# Patient Record
Sex: MALE | Employment: OTHER | URBAN - METROPOLITAN AREA
[De-identification: names, ages, dates, MRNs, and addresses within clinical notes are randomized per-mention and may not be internally consistent; named-entity substitution may affect disease eponyms.]

---

## 2024-09-04 ENCOUNTER — HOSPITAL ENCOUNTER (INPATIENT)
Facility: HOSPITAL | Age: 74
End: 2024-09-04
Attending: STUDENT IN AN ORGANIZED HEALTH CARE EDUCATION/TRAINING PROGRAM | Admitting: INTERNAL MEDICINE
Payer: MEDICARE

## 2024-09-04 ENCOUNTER — APPOINTMENT (OUTPATIENT)
Dept: RADIOLOGY | Facility: HOSPITAL | Age: 74
DRG: 689 | End: 2024-09-04
Payer: MEDICARE

## 2024-09-04 ENCOUNTER — APPOINTMENT (OUTPATIENT)
Dept: CARDIOLOGY | Facility: HOSPITAL | Age: 74
DRG: 689 | End: 2024-09-04
Payer: MEDICARE

## 2024-09-04 DIAGNOSIS — R42 POSTURAL DIZZINESS WITH NEAR SYNCOPE: ICD-10-CM

## 2024-09-04 DIAGNOSIS — R79.89 ELEVATED TROPONIN: ICD-10-CM

## 2024-09-04 DIAGNOSIS — Z79.4 TYPE 2 DIABETES MELLITUS WITHOUT COMPLICATION, WITH LONG-TERM CURRENT USE OF INSULIN (MULTI): ICD-10-CM

## 2024-09-04 DIAGNOSIS — E04.1 THYROID NODULE: ICD-10-CM

## 2024-09-04 DIAGNOSIS — R94.31 PROLONGED Q-T INTERVAL ON ECG: ICD-10-CM

## 2024-09-04 DIAGNOSIS — E11.9 TYPE 2 DIABETES MELLITUS WITHOUT COMPLICATION, WITH LONG-TERM CURRENT USE OF INSULIN (MULTI): ICD-10-CM

## 2024-09-04 DIAGNOSIS — R55 POSTURAL DIZZINESS WITH NEAR SYNCOPE: ICD-10-CM

## 2024-09-04 DIAGNOSIS — R29.6 FREQUENT FALLS: ICD-10-CM

## 2024-09-04 DIAGNOSIS — R29.898 LEFT LEG WEAKNESS: Primary | ICD-10-CM

## 2024-09-04 DIAGNOSIS — I10 PRIMARY HYPERTENSION: ICD-10-CM

## 2024-09-04 LAB
ALBUMIN SERPL-MCNC: 3.8 G/DL (ref 3.5–5)
ALP BLD-CCNC: 58 U/L (ref 35–125)
ALT SERPL-CCNC: 8 U/L (ref 5–40)
ANION GAP SERPL CALC-SCNC: 13 MMOL/L
APPEARANCE UR: CLEAR
AST SERPL-CCNC: 20 U/L (ref 5–40)
BACTERIA #/AREA URNS AUTO: ABNORMAL /HPF
BASOPHILS # BLD AUTO: 0.02 X10*3/UL (ref 0–0.1)
BASOPHILS NFR BLD AUTO: 0.4 %
BILIRUB SERPL-MCNC: 1 MG/DL (ref 0.1–1.2)
BILIRUB UR STRIP.AUTO-MCNC: NEGATIVE MG/DL
BUN SERPL-MCNC: 28 MG/DL (ref 8–25)
CALCIUM SERPL-MCNC: 9 MG/DL (ref 8.5–10.4)
CHLORIDE SERPL-SCNC: 102 MMOL/L (ref 97–107)
CO2 SERPL-SCNC: 25 MMOL/L (ref 24–31)
COLOR UR: YELLOW
CREAT SERPL-MCNC: 1 MG/DL (ref 0.4–1.6)
EGFRCR SERPLBLD CKD-EPI 2021: 79 ML/MIN/1.73M*2
EOSINOPHIL # BLD AUTO: 0.03 X10*3/UL (ref 0–0.4)
EOSINOPHIL NFR BLD AUTO: 0.5 %
ERYTHROCYTE [DISTWIDTH] IN BLOOD BY AUTOMATED COUNT: 15.1 % (ref 11.5–14.5)
EST. AVERAGE GLUCOSE BLD GHB EST-MCNC: 134 MG/DL
GLUCOSE BLD MANUAL STRIP-MCNC: 92 MG/DL (ref 74–99)
GLUCOSE SERPL-MCNC: 103 MG/DL (ref 65–99)
GLUCOSE UR STRIP.AUTO-MCNC: NORMAL MG/DL
HBA1C MFR BLD: 6.3 %
HCT VFR BLD AUTO: 33.7 % (ref 41–52)
HGB BLD-MCNC: 11.1 G/DL (ref 13.5–17.5)
IMM GRANULOCYTES # BLD AUTO: 0.02 X10*3/UL (ref 0–0.5)
IMM GRANULOCYTES NFR BLD AUTO: 0.4 % (ref 0–0.9)
KETONES UR STRIP.AUTO-MCNC: ABNORMAL MG/DL
LEUKOCYTE ESTERASE UR QL STRIP.AUTO: ABNORMAL
LYMPHOCYTES # BLD AUTO: 1.07 X10*3/UL (ref 0.8–3)
LYMPHOCYTES NFR BLD AUTO: 19.1 %
MCH RBC QN AUTO: 29.9 PG (ref 26–34)
MCHC RBC AUTO-ENTMCNC: 32.9 G/DL (ref 32–36)
MCV RBC AUTO: 91 FL (ref 80–100)
MONOCYTES # BLD AUTO: 0.48 X10*3/UL (ref 0.05–0.8)
MONOCYTES NFR BLD AUTO: 8.6 %
MUCOUS THREADS #/AREA URNS AUTO: ABNORMAL /LPF
NEUTROPHILS # BLD AUTO: 3.98 X10*3/UL (ref 1.6–5.5)
NEUTROPHILS NFR BLD AUTO: 71 %
NITRITE UR QL STRIP.AUTO: NEGATIVE
NRBC BLD-RTO: 0 /100 WBCS (ref 0–0)
PH UR STRIP.AUTO: 5.5 [PH]
PLATELET # BLD AUTO: 225 X10*3/UL (ref 150–450)
POTASSIUM SERPL-SCNC: 3.5 MMOL/L (ref 3.4–5.1)
PROT SERPL-MCNC: 7 G/DL (ref 5.9–7.9)
PROT UR STRIP.AUTO-MCNC: ABNORMAL MG/DL
RBC # BLD AUTO: 3.71 X10*6/UL (ref 4.5–5.9)
RBC # UR STRIP.AUTO: NEGATIVE /UL
RBC #/AREA URNS AUTO: ABNORMAL /HPF
SODIUM SERPL-SCNC: 140 MMOL/L (ref 133–145)
SP GR UR STRIP.AUTO: 1.04
SQUAMOUS #/AREA URNS AUTO: ABNORMAL /HPF
T4 FREE SERPL-MCNC: 1.2 NG/DL (ref 0.9–1.7)
TROPONIN T SERPL-MCNC: 103 NG/L
TROPONIN T SERPL-MCNC: 85 NG/L
TROPONIN T SERPL-MCNC: 88 NG/L
TSH SERPL DL<=0.05 MIU/L-ACNC: 2.96 MIU/L (ref 0.27–4.2)
UROBILINOGEN UR STRIP.AUTO-MCNC: ABNORMAL MG/DL
WBC # BLD AUTO: 5.6 X10*3/UL (ref 4.4–11.3)
WBC #/AREA URNS AUTO: ABNORMAL /HPF

## 2024-09-04 PROCEDURE — 2060000001 HC INTERMEDIATE ICU ROOM DAILY

## 2024-09-04 PROCEDURE — 83036 HEMOGLOBIN GLYCOSYLATED A1C: CPT | Performed by: INTERNAL MEDICINE

## 2024-09-04 PROCEDURE — 99223 1ST HOSP IP/OBS HIGH 75: CPT | Performed by: INTERNAL MEDICINE

## 2024-09-04 PROCEDURE — 70450 CT HEAD/BRAIN W/O DYE: CPT

## 2024-09-04 PROCEDURE — 80053 COMPREHEN METABOLIC PANEL: CPT | Performed by: CLINICAL NURSE SPECIALIST

## 2024-09-04 PROCEDURE — 2500000004 HC RX 250 GENERAL PHARMACY W/ HCPCS (ALT 636 FOR OP/ED): Performed by: INTERNAL MEDICINE

## 2024-09-04 PROCEDURE — 84484 ASSAY OF TROPONIN QUANT: CPT | Performed by: CLINICAL NURSE SPECIALIST

## 2024-09-04 PROCEDURE — 72125 CT NECK SPINE W/O DYE: CPT

## 2024-09-04 PROCEDURE — 70450 CT HEAD/BRAIN W/O DYE: CPT | Performed by: STUDENT IN AN ORGANIZED HEALTH CARE EDUCATION/TRAINING PROGRAM

## 2024-09-04 PROCEDURE — 36415 COLL VENOUS BLD VENIPUNCTURE: CPT | Performed by: CLINICAL NURSE SPECIALIST

## 2024-09-04 PROCEDURE — 71045 X-RAY EXAM CHEST 1 VIEW: CPT | Performed by: RADIOLOGY

## 2024-09-04 PROCEDURE — 84443 ASSAY THYROID STIM HORMONE: CPT | Performed by: INTERNAL MEDICINE

## 2024-09-04 PROCEDURE — 70498 CT ANGIOGRAPHY NECK: CPT

## 2024-09-04 PROCEDURE — 84439 ASSAY OF FREE THYROXINE: CPT | Performed by: INTERNAL MEDICINE

## 2024-09-04 PROCEDURE — 2550000001 HC RX 255 CONTRASTS: Performed by: STUDENT IN AN ORGANIZED HEALTH CARE EDUCATION/TRAINING PROGRAM

## 2024-09-04 PROCEDURE — 71045 X-RAY EXAM CHEST 1 VIEW: CPT

## 2024-09-04 PROCEDURE — 70496 CT ANGIOGRAPHY HEAD: CPT | Performed by: RADIOLOGY

## 2024-09-04 PROCEDURE — 2500000001 HC RX 250 WO HCPCS SELF ADMINISTERED DRUGS (ALT 637 FOR MEDICARE OP): Performed by: CLINICAL NURSE SPECIALIST

## 2024-09-04 PROCEDURE — 99291 CRITICAL CARE FIRST HOUR: CPT | Mod: 25 | Performed by: CLINICAL NURSE SPECIALIST

## 2024-09-04 PROCEDURE — 81001 URINALYSIS AUTO W/SCOPE: CPT | Performed by: CLINICAL NURSE SPECIALIST

## 2024-09-04 PROCEDURE — 2500000004 HC RX 250 GENERAL PHARMACY W/ HCPCS (ALT 636 FOR OP/ED): Performed by: CLINICAL NURSE SPECIALIST

## 2024-09-04 PROCEDURE — 96361 HYDRATE IV INFUSION ADD-ON: CPT

## 2024-09-04 PROCEDURE — 82947 ASSAY GLUCOSE BLOOD QUANT: CPT

## 2024-09-04 PROCEDURE — 93005 ELECTROCARDIOGRAM TRACING: CPT

## 2024-09-04 PROCEDURE — 87086 URINE CULTURE/COLONY COUNT: CPT | Mod: WESLAB | Performed by: CLINICAL NURSE SPECIALIST

## 2024-09-04 PROCEDURE — 72125 CT NECK SPINE W/O DYE: CPT | Performed by: RADIOLOGY

## 2024-09-04 PROCEDURE — 96360 HYDRATION IV INFUSION INIT: CPT

## 2024-09-04 PROCEDURE — 85025 COMPLETE CBC W/AUTO DIFF WBC: CPT | Performed by: CLINICAL NURSE SPECIALIST

## 2024-09-04 PROCEDURE — 70498 CT ANGIOGRAPHY NECK: CPT | Performed by: RADIOLOGY

## 2024-09-04 RX ORDER — SODIUM CHLORIDE 9 MG/ML
75 INJECTION, SOLUTION INTRAVENOUS CONTINUOUS
Status: DISCONTINUED | OUTPATIENT
Start: 2024-09-04 | End: 2024-09-05

## 2024-09-04 RX ORDER — ONDANSETRON HYDROCHLORIDE 2 MG/ML
4 INJECTION, SOLUTION INTRAVENOUS EVERY 8 HOURS PRN
Status: ACTIVE | OUTPATIENT
Start: 2024-09-04

## 2024-09-04 RX ORDER — CEFTRIAXONE 1 G/50ML
1 INJECTION, SOLUTION INTRAVENOUS EVERY 24 HOURS
Status: DISCONTINUED | OUTPATIENT
Start: 2024-09-04 | End: 2024-09-06

## 2024-09-04 RX ORDER — ONDANSETRON 4 MG/1
4 TABLET, FILM COATED ORAL EVERY 8 HOURS PRN
Status: ACTIVE | OUTPATIENT
Start: 2024-09-04

## 2024-09-04 RX ORDER — ACETAMINOPHEN 650 MG/1
650 SUPPOSITORY RECTAL EVERY 4 HOURS PRN
Status: ACTIVE | OUTPATIENT
Start: 2024-09-04

## 2024-09-04 RX ORDER — ACETAMINOPHEN 160 MG/5ML
650 SOLUTION ORAL EVERY 4 HOURS PRN
Status: ACTIVE | OUTPATIENT
Start: 2024-09-04

## 2024-09-04 RX ORDER — NAPROXEN SODIUM 220 MG/1
324 TABLET, FILM COATED ORAL ONCE
Status: COMPLETED | OUTPATIENT
Start: 2024-09-04 | End: 2024-09-04

## 2024-09-04 RX ORDER — ACETAMINOPHEN 325 MG/1
650 TABLET ORAL EVERY 4 HOURS PRN
Status: ACTIVE | OUTPATIENT
Start: 2024-09-04

## 2024-09-04 SDOH — HEALTH STABILITY: MENTAL HEALTH
HOW OFTEN DO YOU NEED TO HAVE SOMEONE HELP YOU WHEN YOU READ INSTRUCTIONS, PAMPHLETS, OR OTHER WRITTEN MATERIAL FROM YOUR DOCTOR OR PHARMACY?: NEVER

## 2024-09-04 SDOH — HEALTH STABILITY: PHYSICAL HEALTH: ON AVERAGE, HOW MANY DAYS PER WEEK DO YOU ENGAGE IN MODERATE TO STRENUOUS EXERCISE (LIKE A BRISK WALK)?: 0 DAYS

## 2024-09-04 SDOH — ECONOMIC STABILITY: FOOD INSECURITY: WITHIN THE PAST 12 MONTHS, YOU WORRIED THAT YOUR FOOD WOULD RUN OUT BEFORE YOU GOT MONEY TO BUY MORE.: NEVER TRUE

## 2024-09-04 SDOH — SOCIAL STABILITY: SOCIAL INSECURITY: WITHIN THE LAST YEAR, HAVE YOU BEEN HUMILIATED OR EMOTIONALLY ABUSED IN OTHER WAYS BY YOUR PARTNER OR EX-PARTNER?: NO

## 2024-09-04 SDOH — HEALTH STABILITY: MENTAL HEALTH
STRESS IS WHEN SOMEONE FEELS TENSE, NERVOUS, ANXIOUS, OR CAN'T SLEEP AT NIGHT BECAUSE THEIR MIND IS TROUBLED. HOW STRESSED ARE YOU?: NOT AT ALL

## 2024-09-04 SDOH — SOCIAL STABILITY: SOCIAL NETWORK: HOW OFTEN DO YOU ATTEND CHURCH OR RELIGIOUS SERVICES?: NEVER

## 2024-09-04 SDOH — ECONOMIC STABILITY: TRANSPORTATION INSECURITY
IN THE PAST 12 MONTHS, HAS THE LACK OF TRANSPORTATION KEPT YOU FROM MEDICAL APPOINTMENTS OR FROM GETTING MEDICATIONS?: NO

## 2024-09-04 SDOH — ECONOMIC STABILITY: HOUSING INSECURITY: IN THE PAST 12 MONTHS, HOW MANY TIMES HAVE YOU MOVED WHERE YOU WERE LIVING?: 0

## 2024-09-04 SDOH — SOCIAL STABILITY: SOCIAL INSECURITY
WITHIN THE LAST YEAR, HAVE TO BEEN RAPED OR FORCED TO HAVE ANY KIND OF SEXUAL ACTIVITY BY YOUR PARTNER OR EX-PARTNER?: NO

## 2024-09-04 SDOH — ECONOMIC STABILITY: INCOME INSECURITY: IN THE LAST 12 MONTHS, WAS THERE A TIME WHEN YOU WERE NOT ABLE TO PAY THE MORTGAGE OR RENT ON TIME?: NO

## 2024-09-04 SDOH — HEALTH STABILITY: MENTAL HEALTH: HOW OFTEN DO YOU HAVE 6 OR MORE DRINKS ON ONE OCCASION?: NEVER

## 2024-09-04 SDOH — HEALTH STABILITY: MENTAL HEALTH: HOW OFTEN DO YOU HAVE A DRINK CONTAINING ALCOHOL?: NEVER

## 2024-09-04 SDOH — ECONOMIC STABILITY: FOOD INSECURITY: WITHIN THE PAST 12 MONTHS, THE FOOD YOU BOUGHT JUST DIDN'T LAST AND YOU DIDN'T HAVE MONEY TO GET MORE.: NEVER TRUE

## 2024-09-04 SDOH — HEALTH STABILITY: MENTAL HEALTH: HOW MANY STANDARD DRINKS CONTAINING ALCOHOL DO YOU HAVE ON A TYPICAL DAY?: PATIENT DOES NOT DRINK

## 2024-09-04 SDOH — ECONOMIC STABILITY: TRANSPORTATION INSECURITY
IN THE PAST 12 MONTHS, HAS LACK OF TRANSPORTATION KEPT YOU FROM MEETINGS, WORK, OR FROM GETTING THINGS NEEDED FOR DAILY LIVING?: NO

## 2024-09-04 SDOH — ECONOMIC STABILITY: HOUSING INSECURITY: AT ANY TIME IN THE PAST 12 MONTHS, WERE YOU HOMELESS OR LIVING IN A SHELTER (INCLUDING NOW)?: NO

## 2024-09-04 SDOH — SOCIAL STABILITY: SOCIAL INSECURITY
WITHIN THE LAST YEAR, HAVE YOU BEEN KICKED, HIT, SLAPPED, OR OTHERWISE PHYSICALLY HURT BY YOUR PARTNER OR EX-PARTNER?: NO

## 2024-09-04 SDOH — SOCIAL STABILITY: SOCIAL NETWORK: ARE YOU MARRIED, WIDOWED, DIVORCED, SEPARATED, NEVER MARRIED, OR LIVING WITH A PARTNER?: MARRIED

## 2024-09-04 SDOH — SOCIAL STABILITY: SOCIAL INSECURITY: WITHIN THE LAST YEAR, HAVE YOU BEEN AFRAID OF YOUR PARTNER OR EX-PARTNER?: NO

## 2024-09-04 SDOH — SOCIAL STABILITY: SOCIAL NETWORK
DO YOU BELONG TO ANY CLUBS OR ORGANIZATIONS SUCH AS CHURCH GROUPS UNIONS, FRATERNAL OR ATHLETIC GROUPS, OR SCHOOL GROUPS?: NO

## 2024-09-04 SDOH — ECONOMIC STABILITY: INCOME INSECURITY: HOW HARD IS IT FOR YOU TO PAY FOR THE VERY BASICS LIKE FOOD, HOUSING, MEDICAL CARE, AND HEATING?: NOT HARD AT ALL

## 2024-09-04 SDOH — HEALTH STABILITY: PHYSICAL HEALTH: ON AVERAGE, HOW MANY MINUTES DO YOU ENGAGE IN EXERCISE AT THIS LEVEL?: 0 MIN

## 2024-09-04 SDOH — SOCIAL STABILITY: SOCIAL NETWORK: HOW OFTEN DO YOU GET TOGETHER WITH FRIENDS OR RELATIVES?: NEVER

## 2024-09-04 SDOH — SOCIAL STABILITY: SOCIAL NETWORK: HOW OFTEN DO YOU ATTENT MEETINGS OF THE CLUB OR ORGANIZATION YOU BELONG TO?: NEVER

## 2024-09-04 SDOH — SOCIAL STABILITY: SOCIAL NETWORK: IN A TYPICAL WEEK, HOW MANY TIMES DO YOU TALK ON THE PHONE WITH FAMILY, FRIENDS, OR NEIGHBORS?: NEVER

## 2024-09-04 ASSESSMENT — PAIN - FUNCTIONAL ASSESSMENT: PAIN_FUNCTIONAL_ASSESSMENT: 0-10

## 2024-09-04 ASSESSMENT — ENCOUNTER SYMPTOMS
APPETITE CHANGE: 0
DIARRHEA: 0
CHOKING: 0
EYE PAIN: 0
PALPITATIONS: 0
COLOR CHANGE: 0
APNEA: 0
CONSTIPATION: 0
ACTIVITY CHANGE: 1
ABDOMINAL PAIN: 0
POLYDIPSIA: 0
COUGH: 0
ABDOMINAL DISTENTION: 0
DIAPHORESIS: 0
WEAKNESS: 1
EYE DISCHARGE: 0
FATIGUE: 0

## 2024-09-04 ASSESSMENT — LIFESTYLE VARIABLES
HAVE YOU EVER FELT YOU SHOULD CUT DOWN ON YOUR DRINKING: NO
SKIP TO QUESTIONS 9-10: 1
TOTAL SCORE: 0
AUDIT-C TOTAL SCORE: 0
EVER FELT BAD OR GUILTY ABOUT YOUR DRINKING: NO
HAVE PEOPLE ANNOYED YOU BY CRITICIZING YOUR DRINKING: NO
EVER HAD A DRINK FIRST THING IN THE MORNING TO STEADY YOUR NERVES TO GET RID OF A HANGOVER: NO

## 2024-09-04 ASSESSMENT — COLUMBIA-SUICIDE SEVERITY RATING SCALE - C-SSRS
2. HAVE YOU ACTUALLY HAD ANY THOUGHTS OF KILLING YOURSELF?: NO
1. IN THE PAST MONTH, HAVE YOU WISHED YOU WERE DEAD OR WISHED YOU COULD GO TO SLEEP AND NOT WAKE UP?: NO
6. HAVE YOU EVER DONE ANYTHING, STARTED TO DO ANYTHING, OR PREPARED TO DO ANYTHING TO END YOUR LIFE?: NO

## 2024-09-04 ASSESSMENT — PAIN SCALES - GENERAL: PAINLEVEL_OUTOF10: 0 - NO PAIN

## 2024-09-04 NOTE — H&P
History Of Present Illness  Gabriel Skinner is a 74 y.o. male presenting with dizziness and fall. He denied any fever or chills. Patient's family member who provided most of the history noted that she saw her earlier today and he was in his usual state of health. She noted that she left and got a call that he fell while going to fast food restaurant. He noted that patient was assessed and noted to be doing well. She noted that her BGL was checked at that time and was around 117. About 15 minutes later, she noted that he was staring into space and not answering question appropriately. He reported left leg weakness. But this was not appreciated during my evaluation,  Patient has a hx of stroke about 4 or 5 years ago. She was worried about repeat stroke and that's why he brought patient to the hospital.  During my evaluation, Patient finished his dinner. He denied any symptoms currently and per family member at bed side, patient is back to his base line  Trop was elevated, otherwise all labs were within normal limit.     Past Medical History  Type 2 DM  Hx of stroke    Surgical History  He has no past surgical history on file.     Social History  He denied alcohol and tobacco abuse    Family History  No family history on file.     Allergies  Patient has no known allergies.    Review of Systems   Constitutional:  Positive for activity change. Negative for appetite change, diaphoresis and fatigue.   HENT:  Negative for congestion, drooling, ear pain and mouth sores.    Eyes:  Negative for pain and discharge.   Respiratory:  Negative for apnea, cough and choking.    Cardiovascular:  Negative for chest pain and palpitations.   Gastrointestinal:  Negative for abdominal distention, abdominal pain, constipation and diarrhea.   Endocrine: Negative for cold intolerance, polydipsia and polyuria.   Skin:  Negative for color change and pallor.   Neurological:  Positive for weakness.        Physical Exam  Constitutional:        Appearance: Normal appearance.   HENT:      Head: Normocephalic and atraumatic.      Mouth/Throat:      Mouth: Mucous membranes are moist.      Pharynx: Oropharynx is clear.   Eyes:      Extraocular Movements: Extraocular movements intact.      Pupils: Pupils are equal, round, and reactive to light.   Cardiovascular:      Rate and Rhythm: Normal rate and regular rhythm.      Pulses: Normal pulses.      Heart sounds: Normal heart sounds.   Pulmonary:      Effort: Pulmonary effort is normal.      Breath sounds: Normal breath sounds.   Abdominal:      General: Bowel sounds are normal.      Palpations: Abdomen is soft.   Musculoskeletal:         General: Normal range of motion.      Cervical back: Normal range of motion and neck supple.   Skin:     General: Skin is warm.   Neurological:      General: No focal deficit present.      Mental Status: He is alert and oriented to person, place, and time.          Last Recorded Vitals  /76   Pulse 95   Temp 36.7 °C (98.1 °F)   Resp 18   Wt 70.8 kg (156 lb)   SpO2 99%     Relevant Results         Assessment/Plan     Altered mental status: Stroke team saw patient. Admitted for work up for stroke rule out. MRI ordered. Neuro consulted. TSH and T4 are within normal limit. Possibly unmasking of old stroke by ?UTI.   Urinary Tract infection: Started on IV Ceftriaxone. Wait for Urine culture.   DM type 2: concern for episodes of hypoglycemia. Check A1c. Diabetic diet. Hold off on antihyperglycemic agent.   Hx of CVA: continue home meds.   Elevated troponin: this is most likely demand ischemia. Will appreciate cardiology recommendation. Trop has been trending down.  Fall: unknown mechanism. PT/OT    Eduardo Ortega MD

## 2024-09-04 NOTE — TELECONSULT
HPI:  74 y.o. male with h/o stroke, on plavix, here after fall on curb.  Was found to be confused and with left leg weakness.    Last known Well: 9am  NIH Stroke Scale Reported: 4    Prior Functional Status (Modified Overton Scale):  3 Moderate disability; requiring some help, but able to walk without assistance     Imaging Results:  Head CT: neg  Head/Neck CTA/P: pending     Assessment:   Working Diagnosis:   Ischemic Stroke       Recommendations:   IV tPA is not recommended. Rationale: Out of time window     Patient is NOT a candidate for endovascular treatment.  Rationale:       Additional Recommendations:  Admit for stroke workup  MRI brain  Continue antiplatelets.     Consult completed by:  TELEPHONE communication was used to provide this telehealth service.  Time includes consultation with ED provider and extensive review of data- history, medical records, test results, and neuroimaging studies: 5 - 10 mins was spent in consultation

## 2024-09-04 NOTE — PROGRESS NOTES
09/04/24 1800   Physical Activity   On average, how many days per week do you engage in moderate to strenuous exercise (like a brisk walk)? 0 days   On average, how many minutes do you engage in exercise at this level? 0 min   Financial Resource Strain   How hard is it for you to pay for the very basics like food, housing, medical care, and heating? Not hard   Housing Stability   In the last 12 months, was there a time when you were not able to pay the mortgage or rent on time? N   In the past 12 months, how many times have you moved where you were living? 0   At any time in the past 12 months, were you homeless or living in a shelter (including now)? N   Transportation Needs   In the past 12 months, has lack of transportation kept you from medical appointments or from getting medications? no   In the past 12 months, has lack of transportation kept you from meetings, work, or from getting things needed for daily living? No   Food Insecurity   Within the past 12 months, you worried that your food would run out before you got the money to buy more. Never true   Within the past 12 months, the food you bought just didn't last and you didn't have money to get more. Never true   Stress   Do you feel stress - tense, restless, nervous, or anxious, or unable to sleep at night because your mind is troubled all the time - these days? Not at all   Social Connections   In a typical week, how many times do you talk on the phone with family, friends, or neighbors? Never  (Pt only speaks to his wife on the phone when she calls during the day to check on him)   How often do you get together with friends or relatives? Never  (Pt and his wife live in 2 seperate locations but she sees him daily and assist him with shopping, his BS ect)   How often do you attend Baptist or Taoist services? Never   Do you belong to any clubs or organizations such as Baptist groups, unions, fraternal or athletic groups, or school groups? No   How  often do you attend meetings of the clubs or organizations you belong to? Never   Are you , , , , never , or living with a partner?    Intimate Partner Violence   Within the last year, have you been afraid of your partner or ex-partner? No   Within the last year, have you been humiliated or emotionally abused in other ways by your partner or ex-partner? No   Within the last year, have you been kicked, hit, slapped, or otherwise physically hurt by your partner or ex-partner? No   Within the last year, have you been raped or forced to have any kind of sexual activity by your partner or ex-partner? No   Alcohol Use   Q1: How often do you have a drink containing alcohol? Never   Q2: How many drinks containing alcohol do you have on a typical day when you are drinking? None   Q3: How often do you have six or more drinks on one occasion? Never   Health Literacy   How often do you need to have someone help you when you read instructions, pamphlets, or other written material from your doctor or pharmacy? Never

## 2024-09-04 NOTE — ED PROVIDER NOTES
Department of Emergency Medicine   ED  Provider Note  Admit Date/RoomTime: 9/4/2024  1:43 PM  ED Room: PSY20/PSY20        History of Present Illness:  Chief Complaint   Patient presents with    Altered Mental Status         Gabriel Skinner is a 74 y.o. male history of BPH, acid reflux, hyperlipidemia, hypertension, diabetes history of stroke 2019 presenting to the ED for altered mental status, patient went to the fire station today and had a fall outside.  When family saw him about 20 minutes later he was not acting his normal staring off into space.  Family states this is not normal for him.  When she left him this morning he was acting appropriately following commands looking at her when he would talk to her.  Patient reports he had an episode 2 weeks ago where he had had a fall with similar symptoms the symptoms resolved very quickly.  Patient is currently not on any blood thinners.  Reports he has visual disturbances from his previous stroke with no change.  Usually has difficulty seeing out of his right eye.  Daughter states that he is able to walk with a cane and has never had difficulty moving his left leg like he is at this time.  Last known normal 9:00 this morning per family  Patient states he does not have any complaints at this time no chest pain no headache denies any send or loss of consciousness.  No abdominal pain nausea vomiting or diarrhea no dizziness.      Review of Systems:   Pertinent positives and negatives are stated within HPI, all other systems reviewed and are negative.        --------------------------------------------- PAST HISTORY ---------------------------------------------  Past Medical History:  has no past medical history on file.  Past Surgical History:  has no past surgical history on file.  Social History:    Family History: family history is not on file.. Unless otherwise noted, family history is non contributory  The patient’s home medications have been reviewed.  Allergies:  Patient has no known allergies.        ---------------------------------------------------PHYSICAL EXAM--------------------------------------    GENERAL APPEARANCE: Awake and alert.   VITAL SIGNS: As per the nurses' triage record.   HEENT: Normocephalic, atraumatic.  No raccoon eyes or hinds signs noted.  No epistaxis noted.  No bite to the tongue or lip.  Extraocular muscles are intact. Pupils equal round and reactive to light. Conjunctiva are pink. Negative scleral icterus. Mucous membranes are moist. Tongue in the midline. Pharynx was without erythema or exudates, uvula midline no epistaxis noted.  No bite to the tongue or lip.  No hemotympanum noted.  No epistaxis noted.  No contusions abrasions lacerations noted to the face or scalp.  Patient is obscured in the right eye chronic for patient for family  NECK: Soft Nontender and supple, full gross ROM, no meningeal signs.  Full range of motion without pain  CHEST: Nontender to palpation. Clear to auscultation bilaterally. No rales, rhonchi, or wheezing.   HEART: S1, S2. Regular rate and rhythm. No murmurs, gallops or rubs.  Strong and equal pulses in the extremities.   ABDOMEN: Soft, nontender, nondistended, positive bowel sounds, no palpable masses.  MUSCULCSKELETAL:  Full gross active range of motion.   NEUROLOGICAL: Awake, alert and oriented x 3. Power intact in the upper and lower extremities. Sensation is intact to light touch in the upper and lower extremities.  No effort against gravity left lower extremity no ataxia noted.  Sensation intact.  Was unable to tell me the month reported November  IMMUNOLOGICAL: No lymphatic streaking noted   DERM: No petechiae, rashes, or ecchymoses.          ------------------------- NURSING NOTES AND VITALS REVIEWED ---------------------------  The nursing notes within the ED encounter and vital signs as below have been reviewed by myself  /76   Pulse 95   Temp 36.7 °C (98.1 °F)   Resp 18   Ht 1.829 m (6')   Wt  70.8 kg (156 lb)   SpO2 99%   BMI 21.16 kg/m²     Oxygen Saturation Interpretation: 99% room air    The cardiac monitor revealed sinus rhythm with a heart rate in the 90s as interpreted by me. The cardiac monitor was ordered secondary to the patient's heart rate and to monitor the patient for dysrhythmia.       The patient’s available past medical records and past encounters were reviewed.          -----------------------DIAGNOSTIC RESULTS------------------------  LABS:    Labs Reviewed   CBC WITH AUTO DIFFERENTIAL - Abnormal       Result Value    WBC 5.6      nRBC 0.0      RBC 3.71 (*)     Hemoglobin 11.1 (*)     Hematocrit 33.7 (*)     MCV 91      MCH 29.9      MCHC 32.9      RDW 15.1 (*)     Platelets 225      Neutrophils % 71.0      Immature Granulocytes %, Automated 0.4      Lymphocytes % 19.1      Monocytes % 8.6      Eosinophils % 0.5      Basophils % 0.4      Neutrophils Absolute 3.98      Immature Granulocytes Absolute, Automated 0.02      Lymphocytes Absolute 1.07      Monocytes Absolute 0.48      Eosinophils Absolute 0.03      Basophils Absolute 0.02     COMPREHENSIVE METABOLIC PANEL - Abnormal    Glucose 103 (*)     Sodium 140      Potassium 3.5      Chloride 102      Bicarbonate 25      Urea Nitrogen        Creatinine 1.00      eGFR 79      Calcium 9.0      Albumin 3.8      Alkaline Phosphatase 58      Total Protein 7.0      AST 20      Bilirubin, Total 1.0      ALT 8      Anion Gap 13     SERIAL TROPONIN, INITIAL ([facility]) - Abnormal    Troponin T, High Sensitivity 103 (*)    SERIAL TROPONIN,  2 HOUR ([facility]) - Abnormal    Troponin T, High Sensitivity 88 (*)    POCT GLUCOSE - Normal    POCT Glucose 92     TROPONIN T SERIES, HIGH SENSITIVITY (0, 2 HR, 6 HR)    Narrative:     The following orders were created for panel order Troponin T Series, High Sensitivity (0, 2HR, 6HR).  Procedure                               Abnormality         Status                     ---------                                -----------         ------                     Serial Troponin, Initial...[[id number]]  Abnormal            Final result               Serial Troponin, 2 Hour ...[[id number]]  Abnormal            Final result               Serial Troponin, 6 Hour ...[[id number]]                                                   Please view results for these tests on the individual orders.   URINALYSIS WITH REFLEX CULTURE AND MICROSCOPIC    Narrative:     The following orders were created for panel order Urinalysis with Reflex Culture and Microscopic.  Procedure                               Abnormality         Status                     ---------                               -----------         ------                     Urinalysis with Reflex C...[[id number]]                                                 Extra Urine Gray Tube[[id number]]                                                         Please view results for these tests on the individual orders.   URINALYSIS WITH REFLEX CULTURE AND MICROSCOPIC   EXTRA URINE GRAY TUBE   PROTIME-INR   APTT   TSH   THYROXINE, FREE   POCT GLUCOSE METER       As interpreted by me, the above displayed labs are abnormal. All other labs obtained during this visit were within normal range or not returned as of this dictation.      EKG Interpretation    1421 I personally reviewed and interpreted the EKG @1404: NSR 92, normal axis, no appreciable ischemia, prolonged Qtc 487, and prior EKG reviewed without any appreciable specific/identifiable changes. [BC]           CT angio head and neck w and wo IV contrast   Final Result   1. No large branch vessel cutoffs of the intracranial or cervical   vessels.   2. There is a short segment of moderate stenosis involving the right   A3 segment of the anterior cerebral artery, inferior M3 branch of the   right middle cerebral artery, and long segment luminal irregularity   and mild stenosis of the inferior M2 branch of the left MCA. Findings   may represent  sequela intracranial atherosclerotic disease.   3. There is mild luminal stenosis of the proximal and distal right   intradural vertebral artery.   4. Multiple hypodense bilateral thyroid nodules measuring up to 1.2   cm. Recommend correlation with thyroid ultrasound if not previously   obtained.        I personally reviewed the images/study and I agree with the findings   as stated by Resident Beverly Rios. This study was interpreted at   [facility], [place], [place].        MACRO:   None        Signed by: Taryn Cash 9/4/2024 4:24 PM   Dictation workstation:   [id number]      CT cervical spine wo IV contrast   Final Result   No evidence for an acute fracture or subluxation of the cervical   spine.        MACRO:   None        Signed by: Anjana Nicholas 9/4/2024 2:47 PM   Dictation workstation:   [id number]      XR chest 1 view   Final Result   No acute cardiopulmonary process.        MACRO:   None        Signed by: Anjana Nicholas 9/4/2024 2:45 PM   Dictation workstation:   [id number]      CT brain attack head wo IV contrast   Final Result   No acute intracranial hemorrhage, mass effect, or CT apparent acute   infarct. Chronic microvascular ischemia, sequela of prior bilateral   infarcts and involutional changes.        Dalton Clancy discussed the significance and urgency of this   critical finding by telephone with  FABBY WISE on 9/4/2024 at 2:37   pm.  (**-RCF-**) Findings:  See findings.                  Signed by: Dalton Clancy 9/4/2024 2:38 PM   Dictation workstation:   [id number]              CT angio head and neck w and wo IV contrast   Final Result   1. No large branch vessel cutoffs of the intracranial or cervical   vessels.   2. There is a short segment of moderate stenosis involving the right   A3 segment of the anterior cerebral artery, inferior M3 branch of the   right middle cerebral artery, and long segment luminal irregularity   and mild stenosis of the  inferior M2 branch of the left MCA. Findings   may represent sequela intracranial atherosclerotic disease.   3. There is mild luminal stenosis of the proximal and distal right   intradural vertebral artery.   4. Multiple hypodense bilateral thyroid nodules measuring up to 1.2   cm. Recommend correlation with thyroid ultrasound if not previously   obtained.        I personally reviewed the images/study and I agree with the findings   as stated by Resident Beverly Rios. This study was interpreted at   [facility], [place], [place].        MACRO:   None        Signed by: Taryn Cash 9/4/2024 4:24 PM   Dictation workstation:   [id number]      CT cervical spine wo IV contrast   Final Result   No evidence for an acute fracture or subluxation of the cervical   spine.        MACRO:   None        Signed by: Anjana Nicholas 9/4/2024 2:47 PM   Dictation workstation:   [id number]      XR chest 1 view   Final Result   No acute cardiopulmonary process.        MACRO:   None        Signed by: Anjana Nicholas 9/4/2024 2:45 PM   Dictation workstation:   [id number]      CT brain attack head wo IV contrast   Final Result   No acute intracranial hemorrhage, mass effect, or CT apparent acute   infarct. Chronic microvascular ischemia, sequela of prior bilateral   infarcts and involutional changes.        Dalton Clancy discussed the significance and urgency of this   critical finding by telephone with  FABBY BILLIE on 9/4/2024 at 2:37   pm.  (**-RCF-**) Findings:  See findings.                  Signed by: Dalton Clancy 9/4/2024 2:38 PM   Dictation workstation:   [id number]              ------------------------------ ED COURSE/MEDICAL DECISION MAKING----------------------  Medical Decision Making:   Exam: A medically appropriate exam performed, outlined above, given the known history and presentation.    History obtained from: Review of medical record nursing notes patient patient  family      Social Determinants of Health considered during this visit: Takes care of himself at home family provides care      PAST MEDICAL HISTORY/Chronic Conditions Affecting Care     has no past medical history on file.       CC/HPI Summary, Social Determinants of health, Records Reviewed, DDx, testing done/not done, ED Course, Reassessment, disposition considerations/shared decision making with patient, consults, disposition:   Presents with altered mental status, difficulty moving the left leg sudden onset 20 minutes ago  Plan brain attack  CT brain No acute intracranial hemorrhage, mass effect, or CT apparent acute  infarct. Chronic microvascular ischemia, sequela of prior bilateral  infarcts and involutional changes.  CT cervical No evidence for an acute fracture or subluxation of the cervical  spine  Chest x-ray No acute cardiopulmonary process    Ct angiohead neck 1. No large branch vessel cutoffs of the intracranial or cervical  vessels.  2. There is a short segment of moderate stenosis involving the right  A3 segment of the anterior cerebral artery, inferior M3 branch of the  right middle cerebral artery, and long segment luminal irregularity  and mild stenosis of the inferior M2 branch of the left MCA. Findings  may represent sequela intracranial atherosclerotic disease.  3. There is mild luminal stenosis of the proximal and distal right  intradural vertebral artery.  4. Multiple hypodense bilateral thyroid nodules measuring up to 1.2  cm. Recommend correlation with thyroid ultrasound if not previously  obtained.  EKG  APTT  CBC  CMP  Glucose  PT/INR  Troponin  Urine  NIH  Van negative  Medical Decision Making/Differential Diagnosis:  Differentials include but not limited to stroke versus TIA versus intercranial bleed versus electrode abnormality versus arrhythmia versus dehydration versus UTI versus hypoglycemia  Glucose 92  White blood cell count 5.6  Hemoglobin 1.1 no signs of active  bleeding  Glucose 103  Electrolytes within normal limits normal renal function pending BUN  Normal LFTs  Troponin 103  Urine  Patient presented to the emergency department with altered mental status difficulty moving the left leg.  A brain attack was called due to the onset of symptoms per his family was 20 minutes prior to arrival after fall.  CT of the head showed no acute intracranial hemorrhage mass effect.  Chronic microvascular ischemia history of infarct.  CT cervical showed no evidence of acute fracture or subluxation.  Case was discussed with telestroke neurology recommend admission for stroke workup MRI no need for MRA secondary to CT angio of the head and neck.  No large cord vessel cutoffs of the intracranial or cervical vessels noted.  There is a short segment of moderate stenosis involving the right A3 segment of the anterior cerebral artery inferior M3 branch of the right middle cerebral artery long segment luminal irregularity and mild stenosis of the inferior M2 branch of the left MCA.  The proximal and distal right intradural vertebral artery.  Multiple bilateral thyroid nodules noted.  Patient is not hypoglycemic.  I will continue with Plavix.  Also advised of his elevated troponin okay to give aspirin.  Troponin elevated at 103 repeat troponin 88 no complaints of chest pain EKG per attending note no ST elevation noted prolonged QT.    Based on patient's clinical presentation history and symptoms consistent with left leg weakness concerning for stroke, elevated troponin, frequent falls incidental findings of thyroid nodule discussed case with hospitalist is agreed to admit the patient under their service for further evaluation and treatment  Patient seen and evaluated with attending physician Dr. Perry   Patient family amenable to plan amenable to admission    Urine:   PROCEDURES  Unless otherwise noted below, none      CONSULTS:   IP CONSULT TO NEUROLOGY  IP CONSULT TO CARDIOLOGY      ED  Course as of 09/04/24 1755   Wed Sep 04, 2024   1421 I personally reviewed and interpreted the EKG @1404: NSR 92, normal axis, no appreciable ischemia, prolonged Qtc 487, and prior EKG reviewed without any appreciable specific/identifiable changes. [BC]   1504 Notified by radiology CT negative via epic chat [TB]   1516 Spoke with telestroke  patient is a timeframe for TNK.  Notify him of anything abnormal on the CT angio.  Recommend admission for stroke workup MRI no need for MRI due to CT angio of the head and neck is ordered.  Okay to give aspirin notified of elevated troponin. [TB]   1730 Discussed case with hospitalist is agreed to admit the patient under their service urine pending we will follow-up.  I discussed recommendations of the stroke neurology team.  Has agreed to admit stepdown under hospital service Dr. Ortega [TB]      ED Course User Index  [BC] Xander Perry MD  [TB] PATRIZIA Severino         Diagnoses as of 09/04/24 1755   Left leg weakness   Frequent falls   Elevated troponin   Thyroid nodule   Prolonged Q-T interval on ECG         This patient has remained hemodynamically stable during their ED course.      Critical Care: 31  Critical Care    Performed by: PATRIZIA Severino  Authorized by: Xander Perry MD    Critical care provider statement:     Critical care time (minutes):  31    Critical care time was exclusive of:  Separately billable procedures and treating other patients and teaching time    Critical care was necessary to treat or prevent imminent or life-threatening deterioration of the following conditions:  CNS failure or compromise    Critical care was time spent personally by me on the following activities:  Blood draw for specimens, development of treatment plan with patient or surrogate, discussions with consultants, evaluation of patient's response to treatment, examination of patient, interpretation of cardiac output measurements, obtaining  history from patient or surrogate, ordering and performing treatments and interventions, ordering and review of laboratory studies, ordering and review of radiographic studies, pulse oximetry, re-evaluation of patient's condition and review of old charts          Counseling:  The emergency provider has spoken with the patient and family and discussed today’s results, in addition to providing specific details for the plan of care and counseling regarding the diagnosis and prognosis.  Questions are answered at this time and they are agreeable with the plan.         --------------------------------- IMPRESSION AND DISPOSITION ---------------------------------    IMPRESSION  1. Left leg weakness    2. Frequent falls    3. Elevated troponin    4. Thyroid nodule    5. Prolonged Q-T interval on ECG        DISPOSITION  Disposition: Admit hospitalist service  Patient condition is stable        NOTE: This report was transcribed using voice recognition software. Every effort was made to ensure accuracy; however, inadvertent computerized transcription errors may be present      ALVARADO Severino-SVITLANA  09/04/24 0342

## 2024-09-04 NOTE — PROGRESS NOTES
09/04/24 1802   Discharge Planning   Living Arrangements Alone   Support Systems Spouse/significant other   Type of Residence Private residence   Number of Stairs to Enter Residence 0  (pt lives on the 12 floor of his apt bldg; it has an elevator)   Number of Stairs Within Residence 0   Do you have animals or pets at home? No   Who is requesting discharge planning? Provider   Home or Post Acute Services None   Expected Discharge Disposition Home   Does the patient need discharge transport arranged? No   Financial Resource Strain   How hard is it for you to pay for the very basics like food, housing, medical care, and heating? Not hard   Housing Stability   In the last 12 months, was there a time when you were not able to pay the mortgage or rent on time? N   In the past 12 months, how many times have you moved where you were living? 0   At any time in the past 12 months, were you homeless or living in a shelter (including now)? N   Transportation Needs   In the past 12 months, has lack of transportation kept you from medical appointments or from getting medications? no   Patient Choice   Patient / Family choosing to utilize agency / facility established prior to hospitalization No

## 2024-09-04 NOTE — TREATMENT PLAN
The patient was seen in conjunction with the advanced practice provider, and I performed a substantive portion of the encounter. I personally saw the patient and made/approved the management plan and take complete responsibility for the patient management. I agree with the workup, evaluation, MDM, management and diagnosis of the patient stated in the advanced practice provider's note. All medical decision making was performed by me and communicated to the advanced practice provider. All laboratory studies, radiology, and EKGs were reviewed by me.     History:  Patient presents to the ED with family by EMS for concern of strokelike symptoms.  Report by EMS as patient states he was attempting to go get food and fell in front of the fire station and was brought to the ED by EMS.  Patient overall is a poor historian does not really endorse any significant symptoms and unable to elicit history of prior stroke the family states occurred in 2019 with LLE dysfunction.  Family states patient has been seeing physical therapy with improvement up until about 2 weeks ago and noted steady decline in ability to ambulate and care for himself.  Family states patient appeared to be not acting his normal self and was staring off.  Symptoms seem to have occurred about 1 to 2 hours PTA.  Family states that this morning patient was acting appropriate for them.  Also notes vague visual changes/disturbances from prior stroke but unable to quantify the disturbance or identify which eye.  Family member notes patient uses a cane to ambulate but notes LLE is worse than prior but started decline 2 weeks ago.  Confirmed LKW 9 AM today.  Family member cannot appreciate any events concerning falls or head strikes and no reported infectious symptoms to include fever/chills/diaphoresis.  Patient has not endorsed any cardiopulmonary symptoms, GI symptoms, or changes in urinary habits with family.    VS:  BP (!) 164/95 (BP Location: Left arm, Patient  "Position: Lying)   Pulse 66   Temp 36.1 °C (97 °F) (Temporal)   Resp 16   Ht 1.829 m (6')   Wt 86.9 kg (191 lb 9.3 oz)   SpO2 98%   BMI 25.98 kg/m²      Physical exam:  CONST: alert, normal appearance, no acute distress, does not appear ill/toxic  HEAD: normocephalic, atraumatic  NECK: no JVD  ENT: MMM, no rhinorrhea/congestion, posterior oropharynx clear and oral secretions well controlled  EYES: PEPRL, EOMI, no scleral icterus, no nystagmus  CV: RRR, no murmurs, 2+ equal/symmetrical pulses x4  PULM: CTAB, no respiratory distress, not requiring supplental O2  ABD: soft, flat/non-tender/non-distended, no mass  MSK: No gross evidence of deformities or injuries, no appreciable tenderness palpation x 4  SKIN: warm/dry, no pallor or jaundice, no rash  NEURO: NIHSS 4 (incorrect month and LLE no resistance to gravity), A&Ox4, no facial asymmetry, no focal neuro deficits, gross strength/motor/sensation intact x4, normal gait  PSYCH: Flat affect      I personally reviewed and interpreted the following studies: EKG is NSR 92, normal axis, prolonged QTc 47, no appreciable ischemia, labs are significant for severe troponinemia, baseline normocytic anemia, images are notable for CXR unremarkable, cardiac silhouette WNL, CTH no acute traumatic ICH, no intracerebral/intracranial mass effect, CTA H/N no significant vascular occlusive process/dissections, and CT C-spine no evidence of traumatic vertebral fracture/malalignment .      MDM:  Patient presented to the ED for AMS with mechanical fall from standing and concern for ischemic CVA/active stroke.  Concerning PMHx of history of CVA, HTN, DM, HLD, BPH.    Per Chart Review: Hospital admission on 2/7/2020 for acute encephalopathy, noted exam reported as \"move all extremities\" with mild expressive aphasia.    Assessment/evaluation consistent with low sufficient for ischemic CVA although potential for stroke recrudescence versus TIA, concern for malignant cardiac arrhythmia " in the setting of potential NSTEMI given elevated troponins. No concerning history, clinical evidence/work-up, or exam findings for the concerning differentials of STEMI, acute HF/fluid overload, significant electrolyte/metabolic derangement, infectious/septic processes/encephalopathy, low suspicion for substance-induced encephalopathy, traumatic ICH, traumatic cervical vertebral fracture/malalignment, PTX, focal/multifocal lobar PNA. These conditions have been thoroughly evaluated and determined to be sufficiently unlikely to be the etiology of patient's presenting symptoms.    Scores: NIHSS 4    ED Course/Diagnosis:  ED Course as of 09/06/24 1148   Wed Sep 04, 2024   1421 I personally reviewed and interpreted the EKG @1404: NSR 92, normal axis, no appreciable ischemia, prolonged Qtc 487, and prior EKG reviewed without any appreciable specific/identifiable changes. [BC]   1504 Notified by radiology CT negative via [employer] chat [TB]   1516 Spoke with telestroke  patient is a timeframe for TNK.  Notify him of anything abnormal on the CT angio.  Recommend admission for stroke workup MRI no need for MRI due to CT angio of the head and neck is ordered.  Okay to give aspirin notified of elevated troponin. [TB]   4428 Discussed case with hospitalist is agreed to admit the patient under their service urine pending we will follow-up.  I discussed recommendations of the stroke neurology team.  Has agreed to admit stepdown under hospital service Dr. Ortega [TB]      ED Course User Index  [BC] Xander Perry MD  [TB] Ana M Blas, APRN-CNP         Diagnoses as of 09/06/24 1148   Left leg weakness   Frequent falls   Elevated troponin   Thyroid nodule   Prolonged Q-T interval on ECG       1. Left leg weakness        2. Frequent falls        3. Elevated troponin  Transthoracic Echo (TTE) Complete    Transthoracic Echo (TTE) Complete      4. Thyroid nodule        5. Prolonged Q-T interval on ECG        6. Postural  dizziness with near syncope  Transthoracic Echo (TTE) Complete    Transthoracic Echo (TTE) Complete

## 2024-09-04 NOTE — ED TRIAGE NOTES
"Pt had a mechanical fall outside of [employer]. Pt was evaluated and pt signed off. Approx 20 mins later wife was concerned for change in mental status and states \"he is not acting himself\" Pt has a hx of stroke. Pt has LLE weakness from prior stroke. Pt has no complaints.   "

## 2024-09-04 NOTE — PROGRESS NOTES
09/04/24 1804   [facility] Disability Status   Are you deaf or do you have serious difficulty hearing? N   Are you blind or do you have serious difficulty seeing, even when wearing glasses? N   Because of a physical, mental, or emotional condition, do you have serious difficulty concentrating, remembering, or making decisions? (5 years old or older) Y   Do you have serious difficulty walking or climbing stairs? Y   Do you have serious difficulty dressing or bathing? N   Because of a physical, mental, or emotional condition, do you have serious difficulty doing errands alone such as visiting the doctor? Y

## 2024-09-04 NOTE — PROGRESS NOTES
09/04/24 1804   Current Planned Discharge Disposition   Current Planned Discharge Disposition Home

## 2024-09-04 NOTE — CARE PLAN
Pt does not have a POA or Living Will  ADOD: 1 day    Pt lives on the 12 floor of his apt [facility]; with an elevator. His wife Yanely lives a few minutes away but she sees him daily, she checks his BS, takes him shopping and she calls him daily  He has been using a cane for 2 weeks due to the leg weakness. He has had 2 falls in the last 1-1/2 weeks. He had a stroke 5 yrs ago. He has been independent with showering and dressing. No home 02/cpap/bipap.  His PCP is Dr. White at [facility] on [facility]; he uses [facility] in [place] for his meds and he can afford them  He wears glasses, no hearing aids, but he is somewhat [place]  He denies depression and anxiety  Pt is here for left leg weakness.    DISCHARGE PLAN: HOME WITH ASSIST FROM WIFE--UNKNOWN IF PT WILL NEED OR WANT HHC/PT OR OUTPT PT--DO NOT DISCHARGE PATIENT BEFORE SPEAKING WITH CARE COORDINATION

## 2024-09-05 ENCOUNTER — APPOINTMENT (OUTPATIENT)
Dept: CARDIOLOGY | Facility: HOSPITAL | Age: 74
End: 2024-09-05
Payer: MEDICARE

## 2024-09-05 ENCOUNTER — APPOINTMENT (OUTPATIENT)
Dept: RADIOLOGY | Facility: HOSPITAL | Age: 74
DRG: 689 | End: 2024-09-05
Payer: MEDICARE

## 2024-09-05 LAB
AORTIC VALVE MEAN GRADIENT: 4 MMHG
AORTIC VALVE PEAK VELOCITY: 1.36 M/S
APTT PPP: 28.5 SECONDS (ref 22–32.5)
ATRIAL RATE: 93 BPM
AV PEAK GRADIENT: 7.4 MMHG
AVA (PEAK VEL): 2.26 CM2
AVA (VTI): 2.89 CM2
BODY SURFACE AREA: 1.9 M2
EJECTION FRACTION APICAL 4 CHAMBER: 47.9
EJECTION FRACTION: 58 %
GLUCOSE BLD MANUAL STRIP-MCNC: 149 MG/DL (ref 74–99)
HOLD SPECIMEN: NORMAL
INR PPP: 1.1 (ref 0.9–1.2)
LEFT ATRIUM VOLUME AREA LENGTH INDEX BSA: 46 ML/M2
LEFT VENTRICLE INTERNAL DIMENSION DIASTOLE: 4.26 CM (ref 3.5–6)
LEFT VENTRICULAR OUTFLOW TRACT DIAMETER: 2 CM
LV EJECTION FRACTION BIPLANE: 51 %
MITRAL VALVE E/A RATIO: 0.74
P AXIS: 6 DEGREES
P OFFSET: 185 MS
P ONSET: 142 MS
PR INTERVAL: 130 MS
PROTHROMBIN TIME: 11.4 SECONDS (ref 9.3–12.7)
Q ONSET: 207 MS
QRS COUNT: 16 BEATS
QRS DURATION: 88 MS
QT INTERVAL: 390 MS
QTC CALCULATION(BAZETT): 484 MS
QTC FREDERICIA: 451 MS
R AXIS: -10 DEGREES
RIGHT VENTRICLE FREE WALL PEAK S': 13.4 CM/S
RIGHT VENTRICLE PEAK SYSTOLIC PRESSURE: 30.3 MMHG
T AXIS: 48 DEGREES
T OFFSET: 402 MS
TRICUSPID ANNULAR PLANE SYSTOLIC EXCURSION: 3.2 CM
VENTRICULAR RATE: 93 BPM

## 2024-09-05 PROCEDURE — 82947 ASSAY GLUCOSE BLOOD QUANT: CPT

## 2024-09-05 PROCEDURE — 99232 SBSQ HOSP IP/OBS MODERATE 35: CPT | Performed by: INTERNAL MEDICINE

## 2024-09-05 PROCEDURE — 70551 MRI BRAIN STEM W/O DYE: CPT

## 2024-09-05 PROCEDURE — 36415 COLL VENOUS BLD VENIPUNCTURE: CPT | Performed by: CLINICAL NURSE SPECIALIST

## 2024-09-05 PROCEDURE — 85610 PROTHROMBIN TIME: CPT | Performed by: CLINICAL NURSE SPECIALIST

## 2024-09-05 PROCEDURE — 93306 TTE W/DOPPLER COMPLETE: CPT

## 2024-09-05 PROCEDURE — 99223 1ST HOSP IP/OBS HIGH 75: CPT | Performed by: STUDENT IN AN ORGANIZED HEALTH CARE EDUCATION/TRAINING PROGRAM

## 2024-09-05 PROCEDURE — 2500000004 HC RX 250 GENERAL PHARMACY W/ HCPCS (ALT 636 FOR OP/ED): Performed by: INTERNAL MEDICINE

## 2024-09-05 PROCEDURE — 2060000001 HC INTERMEDIATE ICU ROOM DAILY

## 2024-09-05 PROCEDURE — 2500000002 HC RX 250 W HCPCS SELF ADMINISTERED DRUGS (ALT 637 FOR MEDICARE OP, ALT 636 FOR OP/ED): Performed by: INTERNAL MEDICINE

## 2024-09-05 PROCEDURE — 2500000001 HC RX 250 WO HCPCS SELF ADMINISTERED DRUGS (ALT 637 FOR MEDICARE OP): Performed by: INTERNAL MEDICINE

## 2024-09-05 PROCEDURE — 70551 MRI BRAIN STEM W/O DYE: CPT | Performed by: RADIOLOGY

## 2024-09-05 PROCEDURE — 85730 THROMBOPLASTIN TIME PARTIAL: CPT | Performed by: CLINICAL NURSE SPECIALIST

## 2024-09-05 PROCEDURE — 93306 TTE W/DOPPLER COMPLETE: CPT | Performed by: INTERNAL MEDICINE

## 2024-09-05 PROCEDURE — 99222 1ST HOSP IP/OBS MODERATE 55: CPT | Performed by: NURSE PRACTITIONER

## 2024-09-05 RX ORDER — ATORVASTATIN CALCIUM 40 MG/1
40 TABLET, FILM COATED ORAL DAILY
Status: ON HOLD | COMMUNITY

## 2024-09-05 RX ORDER — METOPROLOL TARTRATE 25 MG/1
25 TABLET, FILM COATED ORAL 2 TIMES DAILY
Status: ON HOLD | COMMUNITY

## 2024-09-05 RX ORDER — DEXTROSE 50 % IN WATER (D50W) INTRAVENOUS SYRINGE
25
Status: ACTIVE | OUTPATIENT
Start: 2024-09-05

## 2024-09-05 RX ORDER — INSULIN GLARGINE 100 [IU]/ML
20 INJECTION, SOLUTION SUBCUTANEOUS NIGHTLY
Status: DISCONTINUED | OUTPATIENT
Start: 2024-09-05 | End: 2024-09-06

## 2024-09-05 RX ORDER — METOPROLOL TARTRATE 25 MG/1
25 TABLET, FILM COATED ORAL 2 TIMES DAILY
Status: DISPENSED | OUTPATIENT
Start: 2024-09-05

## 2024-09-05 RX ORDER — METFORMIN HYDROCHLORIDE 500 MG/1
500 TABLET ORAL
Status: ON HOLD | COMMUNITY

## 2024-09-05 RX ORDER — DEXTROSE 50 % IN WATER (D50W) INTRAVENOUS SYRINGE
12.5
Status: ACTIVE | OUTPATIENT
Start: 2024-09-05

## 2024-09-05 RX ORDER — INSULIN GLARGINE 100 [IU]/ML
20 INJECTION, SOLUTION SUBCUTANEOUS NIGHTLY
Status: ON HOLD | COMMUNITY

## 2024-09-05 RX ORDER — CLOPIDOGREL BISULFATE 75 MG/1
75 TABLET ORAL DAILY
Status: ON HOLD | COMMUNITY

## 2024-09-05 RX ORDER — FINASTERIDE 5 MG/1
5 TABLET, FILM COATED ORAL DAILY
Status: DISPENSED | OUTPATIENT
Start: 2024-09-05

## 2024-09-05 RX ORDER — FINASTERIDE 5 MG/1
5 TABLET, FILM COATED ORAL DAILY
Status: ON HOLD | COMMUNITY

## 2024-09-05 RX ORDER — ATORVASTATIN CALCIUM 40 MG/1
40 TABLET, FILM COATED ORAL DAILY
Status: DISPENSED | OUTPATIENT
Start: 2024-09-05

## 2024-09-05 RX ORDER — CLOPIDOGREL BISULFATE 75 MG/1
75 TABLET ORAL DAILY
Status: DISPENSED | OUTPATIENT
Start: 2024-09-05

## 2024-09-05 SDOH — SOCIAL STABILITY: SOCIAL INSECURITY: HAS ANYONE EVER THREATENED TO HURT YOUR FAMILY OR YOUR PETS?: NO

## 2024-09-05 SDOH — SOCIAL STABILITY: SOCIAL INSECURITY: ABUSE: ADULT

## 2024-09-05 SDOH — SOCIAL STABILITY: SOCIAL INSECURITY: DOES ANYONE TRY TO KEEP YOU FROM HAVING/CONTACTING OTHER FRIENDS OR DOING THINGS OUTSIDE YOUR HOME?: NO

## 2024-09-05 SDOH — SOCIAL STABILITY: SOCIAL INSECURITY: WERE YOU ABLE TO COMPLETE ALL THE BEHAVIORAL HEALTH SCREENINGS?: YES

## 2024-09-05 SDOH — SOCIAL STABILITY: SOCIAL INSECURITY: HAVE YOU HAD THOUGHTS OF HARMING ANYONE ELSE?: NO

## 2024-09-05 SDOH — SOCIAL STABILITY: SOCIAL INSECURITY: DO YOU FEEL ANYONE HAS EXPLOITED OR TAKEN ADVANTAGE OF YOU FINANCIALLY OR OF YOUR PERSONAL PROPERTY?: NO

## 2024-09-05 SDOH — SOCIAL STABILITY: SOCIAL INSECURITY: ARE THERE ANY APPARENT SIGNS OF INJURIES/BEHAVIORS THAT COULD BE RELATED TO ABUSE/NEGLECT?: NO

## 2024-09-05 SDOH — SOCIAL STABILITY: SOCIAL INSECURITY: HAVE YOU HAD ANY THOUGHTS OF HARMING ANYONE ELSE?: NO

## 2024-09-05 SDOH — SOCIAL STABILITY: SOCIAL INSECURITY: ARE YOU OR HAVE YOU BEEN THREATENED OR ABUSED PHYSICALLY, EMOTIONALLY, OR SEXUALLY BY ANYONE?: NO

## 2024-09-05 SDOH — SOCIAL STABILITY: SOCIAL INSECURITY: DO YOU FEEL UNSAFE GOING BACK TO THE PLACE WHERE YOU ARE LIVING?: NO

## 2024-09-05 ASSESSMENT — ACTIVITIES OF DAILY LIVING (ADL)
JUDGMENT_ADEQUATE_SAFELY_COMPLETE_DAILY_ACTIVITIES: YES
ADEQUATE_TO_COMPLETE_ADL: YES
HEARING - RIGHT EAR: FUNCTIONAL
HEARING - LEFT EAR: FUNCTIONAL
WALKS IN HOME: NEEDS ASSISTANCE
DRESSING YOURSELF: INDEPENDENT
PATIENT'S MEMORY ADEQUATE TO SAFELY COMPLETE DAILY ACTIVITIES?: YES
GROOMING: INDEPENDENT
TOILETING: INDEPENDENT
FEEDING YOURSELF: INDEPENDENT
BATHING: INDEPENDENT

## 2024-09-05 ASSESSMENT — COGNITIVE AND FUNCTIONAL STATUS - GENERAL
MOVING TO AND FROM BED TO CHAIR: A LITTLE
STANDING UP FROM CHAIR USING ARMS: A LITTLE
CLIMB 3 TO 5 STEPS WITH RAILING: A LITTLE
MOBILITY SCORE: 20
DAILY ACTIVITIY SCORE: 24
WALKING IN HOSPITAL ROOM: A LITTLE
PATIENT BASELINE BEDBOUND: NO

## 2024-09-05 ASSESSMENT — LIFESTYLE VARIABLES
SKIP TO QUESTIONS 9-10: 1
PRESCIPTION_ABUSE_PAST_12_MONTHS: NO
HOW OFTEN DO YOU HAVE 6 OR MORE DRINKS ON ONE OCCASION: NEVER
AUDIT-C TOTAL SCORE: 0
HOW OFTEN DO YOU HAVE A DRINK CONTAINING ALCOHOL: NEVER
HOW MANY STANDARD DRINKS CONTAINING ALCOHOL DO YOU HAVE ON A TYPICAL DAY: PATIENT DOES NOT DRINK
SUBSTANCE_ABUSE_PAST_12_MONTHS: NO
AUDIT-C TOTAL SCORE: 0

## 2024-09-05 ASSESSMENT — PAIN SCALES - GENERAL
PAINLEVEL_OUTOF10: 0 - NO PAIN
PAINLEVEL_OUTOF10: 0 - NO PAIN

## 2024-09-05 ASSESSMENT — PATIENT HEALTH QUESTIONNAIRE - PHQ9
SUM OF ALL RESPONSES TO PHQ9 QUESTIONS 1 & 2: 0
2. FEELING DOWN, DEPRESSED OR HOPELESS: NOT AT ALL
1. LITTLE INTEREST OR PLEASURE IN DOING THINGS: NOT AT ALL

## 2024-09-05 NOTE — PROGRESS NOTES
"Physical Therapy                 Therapy Communication Note    Patient Name: Gabriel Skinner  MRN: [id number]  Today's Date: 9/5/2024     Discipline: Physical Therapy    Missed Visit Reason: Cancel. Order received and chart reviewed. Pt was admitted for altered mental status, UTI & to rule out stroke. Per cardiology consult, \"CT brain does reveal significant atherosclerosis of the cerebral arteries.\" MRI brain and neurology consult were still pending. Will hold eval at this time for further medical work up and plan of care.    Missed Time: Cancelled at 09:55.      "

## 2024-09-05 NOTE — CARE PLAN
The patient's goals for the shift include  Talk to Dr and get a plan of care    The clinical goals for the shift include  Safety, pain management and stable vitals

## 2024-09-05 NOTE — CONSULTS
Inpatient consult to Cardiology  Consult performed by: ALVARADO Red-CNP  Consult ordered by: Eduardo Ortega MD  Reason for consult: Elevated high-sensitivity troponin        History Of Present Illness:    Gabriel Skinner is a 74 y.o. male presenting with accidental fall and weakness.  No current cardiologist.  Past medical history of stroke with mild left-sided chronic lower extremity weakness and remote tobacco use.  Patient states some generalized increased weakness and dizziness over the past 2 weeks.  He denies complaints of chest pain or pressure, palpitations or feeling rapid heart rate.  Patient was noted to have increased weakness and was driven by his wife to the fire department where he was assessed and determined to be stable.  The patient was feeling okay at that time and left entire department.  Approximate 20 minutes later the patient had  a change in his mental status and was brought to the emergency department.  In the emergency department EKG a sinus rhythm with evidence of septal infarct.  High-sensitivity troponin value of 103, 88 and 85.  Chest x-ray without acute findings.  CT brain that acute findings.  No significant laboratory diagnostics otherwise.  Was noted to be mildly hypertensive.  Code brain attack was initially called and assessed by neurology, did not receive TKN.  Admitted on telemetry for further testing treatment.    Last Recorded Vitals:  Vitals:    09/04/24 2353 09/05/24 0400 09/05/24 0500 09/05/24 0600   BP: 131/68 (!) 155/94 (!) 162/93 156/87   BP Location:  Right arm Right arm Right arm   Patient Position:  Lying Lying Lying   Pulse: 98 93 96 97   Resp: 18 16  16   Temp:       SpO2: 99% 98% 99% 98%   Weight:       Height:           Last Labs:  CBC - 9/4/2024:  2:21 PM  5.6 11.1 225    33.7      CMP - 9/4/2024:  2:21 PM  9.0 7.0 20 --- 1.0   _ 3.8 8 58      PTT - No results in last year.  _   _ _     Hemoglobin A1C   Date/Time Value Ref Range Status    09/04/2024 02:21 PM 6.3 (H) See below % Final      Results for orders placed or performed during the hospital encounter of 09/04/24 (from the past 24 hour(s))   CBC and Auto Differential   Result Value Ref Range    WBC 5.6 4.4 - 11.3 x10*3/uL    nRBC 0.0 0.0 - 0.0 /100 WBCs    RBC 3.71 (L) 4.50 - 5.90 x10*6/uL    Hemoglobin 11.1 (L) 13.5 - 17.5 g/dL    Hematocrit 33.7 (L) 41.0 - 52.0 %    MCV 91 80 - 100 fL    MCH 29.9 26.0 - 34.0 pg    MCHC 32.9 32.0 - 36.0 g/dL    RDW 15.1 (H) 11.5 - 14.5 %    Platelets 225 150 - 450 x10*3/uL    Neutrophils % 71.0 40.0 - 80.0 %    Immature Granulocytes %, Automated 0.4 0.0 - 0.9 %    Lymphocytes % 19.1 13.0 - 44.0 %    Monocytes % 8.6 2.0 - 10.0 %    Eosinophils % 0.5 0.0 - 6.0 %    Basophils % 0.4 0.0 - 2.0 %    Neutrophils Absolute 3.98 1.60 - 5.50 x10*3/uL    Immature Granulocytes Absolute, Automated 0.02 0.00 - 0.50 x10*3/uL    Lymphocytes Absolute 1.07 0.80 - 3.00 x10*3/uL    Monocytes Absolute 0.48 0.05 - 0.80 x10*3/uL    Eosinophils Absolute 0.03 0.00 - 0.40 x10*3/uL    Basophils Absolute 0.02 0.00 - 0.10 x10*3/uL   Comprehensive metabolic panel   Result Value Ref Range    Glucose 103 (H) 65 - 99 mg/dL    Sodium 140 133 - 145 mmol/L    Potassium 3.5 3.4 - 5.1 mmol/L    Chloride 102 97 - 107 mmol/L    Bicarbonate 25 24 - 31 mmol/L    Urea Nitrogen 28 (H) 8 - 25 mg/dL    Creatinine 1.00 0.40 - 1.60 mg/dL    eGFR 79 >60 mL/min/1.73m*2    Calcium 9.0 8.5 - 10.4 mg/dL    Albumin 3.8 3.5 - 5.0 g/dL    Alkaline Phosphatase 58 35 - 125 U/L    Total Protein 7.0 5.9 - 7.9 g/dL    AST 20 5 - 40 U/L    Bilirubin, Total 1.0 0.1 - 1.2 mg/dL    ALT 8 5 - 40 U/L    Anion Gap 13 <=19 mmol/L   Serial Troponin, Initial ([facility])   Result Value Ref Range    Troponin T, High Sensitivity 103 (HH) <=14 ng/L   Hemoglobin A1c   Result Value Ref Range    Hemoglobin A1C 6.3 (H) See below %    Estimated Average Glucose 134 Not Established mg/dL   ECG 12 lead   Result Value Ref Range    Ventricular Rate  93 BPM    Atrial Rate 93 BPM    OR Interval 130 ms    QRS Duration 88 ms    QT Interval 390 ms    QTC Calculation(Bazett) 484 ms    P Axis 6 degrees    R Axis -10 degrees    T Axis 48 degrees    QRS Count 16 beats    Q Onset 207 ms    P Onset 142 ms    P Offset 185 ms    T Offset 402 ms    QTC Fredericia 451 ms   POCT GLUCOSE   Result Value Ref Range    POCT Glucose 92 74 - 99 mg/dL   Serial Troponin, 2 Hour ([facility])   Result Value Ref Range    Troponin T, High Sensitivity 88 (HH) <=14 ng/L   TSH   Result Value Ref Range    Thyroid Stimulating Hormone 2.96 0.27 - 4.20 mIU/L   T4, free   Result Value Ref Range    Thyroxine, Free 1.20 0.90 - 1.70 ng/dL   Urinalysis with Reflex Culture and Microscopic   Result Value Ref Range    Color, Urine Yellow Light-Yellow, Yellow, Dark-Yellow    Appearance, Urine Clear Clear    Specific Gravity, Urine 1.044 (N) 1.005 - 1.035    pH, Urine 5.5 5.0, 5.5, 6.0, 6.5, 7.0, 7.5, 8.0    Protein, Urine 20 (TRACE) NEGATIVE, 10 (TRACE), 20 (TRACE) mg/dL    Glucose, Urine Normal Normal mg/dL    Blood, Urine NEGATIVE NEGATIVE    Ketones, Urine 10 (1+) (A) NEGATIVE mg/dL    Bilirubin, Urine NEGATIVE NEGATIVE    Urobilinogen, Urine 2 (1+) (A) Normal mg/dL    Nitrite, Urine NEGATIVE NEGATIVE    Leukocyte Esterase, Urine 75 Shante/µL (A) NEGATIVE   Extra Urine Gray Tube   Result Value Ref Range    Extra Tube Hold for add-ons.    Microscopic Only, Urine   Result Value Ref Range    WBC, Urine 11-20 (A) 1-5, NONE /HPF    RBC, Urine 1-2 NONE, 1-2, 3-5 /HPF    Squamous Epithelial Cells, Urine 1-9 (SPARSE) Reference range not established. /HPF    Bacteria, Urine 1+ (A) NONE SEEN /HPF    Mucus, Urine 2+ Reference range not established. /LPF   Serial Troponin, 6 Hour ([facility])   Result Value Ref Range    Troponin T, High Sensitivity 85 (HH) <=14 ng/L       Last I/O:  I/O last 3 completed shifts:  In: 1000 (14.1 mL/kg) [IV Piggyback:1000]  Out: - (0 mL/kg)   Weight: 70.8 kg     Past Cardiology Tests (Last 3  Years):  EKG:  ECG 12 lead 09/04/2024 (Preliminary): Sinus rhythm with evidence of septal infarct and significant artifact        Past Medical History:  Stroke  Tobacco use    Past Surgical History:  Denies significant surgical history      Social History:  He has no history on file for tobacco use, alcohol use, and drug use.    Family History:  No family history on file.     Allergies:  Patient has no known allergies.    Inpatient Medications:  Scheduled medications   Medication Dose Route Frequency    cefTRIAXone  1 g intravenous q24h    perflutren lipid microspheres  0.5-10 mL of dilution intravenous Once in imaging     PRN medications   Medication    acetaminophen    Or    acetaminophen    Or    acetaminophen    ondansetron    Or    ondansetron     Continuous Medications   Medication Dose Last Rate    sodium chloride 0.9%  75 mL/hr 75 mL/hr (09/05/24 0440)     Outpatient Medications:  No current outpatient medications    Physical Exam:  General: alert, oriented x 3, very pleasant  HEENT: normal cephalic, atraumatic, no scleral icterus,   Neck: No JVD, bruit or thrill, masses or tenderness   Heart: S1/S2, Rate 70, Rhythm regular, no s3 or s4, no murmur, thrill, or heaves at PMI.   Lungs: Clear, equal expansion and excursion, no wheezes, crackles, rhales or rhonci.  Room air.  No conversational dyspnea patient.  No tachypnea.  No pain with deep inspiration   Abdomen: bowel sounds x 4, soft, non-tender to light and deep palpation, No masses, guarding, or CVA tenderness   Genitourinary: deferred   Extremities: No significant upper or lower extremity edema appreciated.  Very mild weakness noted to left lower extremity.  Patient is able to move this extremity and hold it against gravity.       Assessment/Plan     Rule out CVA  Elevated high-sensitivity troponin  History of stroke  Hypertensive disorder  History of tobacco use  Atherosclerosis of cerebral arteries    Overall impression:    9/5: As above, certainly  appears to be more relative to a TIA/CVA event.  CT brain does reveal significant atherosclerosis of the cerebral arteries.  To this point there is been no evidence of arrhythmia, no atrial fibrillation or flutter.  He remains in a sinus rhythm.  His EKG does reveal a previous septal infarct which the patient was unaware of.  He reports he does not have a significant cardiac history otherwise.  Home medications are pending at this time.  Limited history is available.  Does remain with some left-sided weakness which apparently is chronic for this patient from his previous stroke.  Concerning his elevated high-sensitivity troponins, his EKG is nonischemic.  He reports no chest pain or anginal equivalents.  Although his troponins are elevated they are generally flat, not a pattern consistent with evolving acute coronary syndrome.  Elevation high-sensitivity troponin may be relative to an acute cerebral infarct.  Await results of MRI brain.  Have ordered an echocardiogram for assessment of overall ejection fraction and to assess for wall motion abnormality suggestive of progressive coronary artery disease.  Otherwise at this time the patient's blood pressure is adequate he is euvolemic on examination breathing comfortably room air.  Will follow with you.       Code Status:  No Order    I spent 60 minutes in the professional and overall care of this patient.        Melo Mathews, APRN-CNP

## 2024-09-05 NOTE — PROGRESS NOTES
Itz Lindsay is a 74 y.o. male on day 1 of admission presenting with Left leg weakness.      Subjective   Mr. Lindsay was seen and examined. He denied nausea and vomiting. No chest pain. He is aware of plans to get MRI of his head. Discussed management     Objective     Last Recorded Vitals  BP (!) 172/97 (BP Location: Right arm, Patient Position: Sitting)   Pulse 109   Temp 36.8 °C (98.2 °F) (Oral)   Resp 20   Wt 70.8 kg (156 lb)   SpO2 100%   Intake/Output last 3 Shifts:    Intake/Output Summary (Last 24 hours) at 9/5/2024 1358  Last data filed at 9/5/2024 1209  Gross per 24 hour   Intake 1681.25 ml   Output --   Net 1681.25 ml       Admission Weight  Weight: 70.8 kg (156 lb) (09/04/24 1351)    Daily Weight  09/04/24 : 70.8 kg (156 lb)    Image Results  Transthoracic Echo (TTE) Complete             [place] [address]             Phone [phone]    TRANSTHORACIC ECHOCARDIOGRAM REPORT    Patient Name:      ITZ LINDSAY       Reading Physician:    [id number] [facility]                                                                   Study Date:        9/5/2024              Ordering Provider:    [id number] HERMINIA TAYLOR  MRN/PID:           [id number]              Fellow:  Accession#:        [id number]          Nurse:  Date of Birth/Age: 1950 / 74 years   Sonographer:          Iliana Oneil RDCS  Gender:            M                     Additional Staff:  Height:            182.88 cm             Admit Date:  Weight:            70.76 kg              Admission Status:     Inpatient -                                                                 Routine  BSA / BMI:         1.92 m2 / 21.16 kg/m2 Department Location:  [facility] ER  Blood Pressure: 156 /87 mmHg    Study Type:    TRANSTHORACIC ECHO (TTE) COMPLETE  Diagnosis/ICD:  Elevated Troponin-R79.89; Dizziness and giddiness-R42;                 Syncope-R55  Indication:    elevated troponin near syncope dizziness  CPT Codes:     Echo Complete w Full [id number]    Patient History:  Smoker:            Former.  Pertinent History: CVA and Syncope. dizziness near syncope fall weak elev                     troponin ho stroke.    Study Detail: The following Echo studies were performed: 2D, M-Mode, Doppler and                color flow. Technically challenging study due to prominent lung                artifact.       PHYSICIAN INTERPRETATION:  Left Ventricle: The left ventricular systolic function is normal, with a visually estimated ejection fraction of 55-60%. There are no regional wall motion abnormalities. The left ventricular cavity size is normal. Left ventricular diastolic filling was indeterminate.  Left Atrium: The left atrium is normal in size.  Right Ventricle: The right ventricle is normal in size. There is normal right ventricular global systolic function.  Right Atrium: The right atrium is normal in size.  Aortic Valve: The aortic valve appears abnormal. The aortic valve appears bicuspid. The aortic valve dimensionless index is 0.92. There is no evidence of aortic valve regurgitation. The peak instantaneous gradient of the aortic valve is 7.4 mmHg. The mean gradient of the aortic valve is 4.0 mmHg.  Mitral Valve: The mitral valve is normal in structure. There is no evidence of mitral valve regurgitation.  Tricuspid Valve: The tricuspid valve is structurally normal. No evidence of tricuspid regurgitation.  Pulmonic Valve: The pulmonic valve is not well visualized. The pulmonic valve regurgitation was not well visualized.  Pericardium: There is no pericardial effusion noted.  Aorta: The aortic root is normal.       CONCLUSIONS:   1. The left ventricular systolic function is normal, with a visually estimated ejection fraction of 55-60%.   2. Left ventricular diastolic filling was  indeterminate.   3. There is normal right ventricular global systolic function.   4. The aortic valve appears bicuspid.   5. No aortic valve stenosis or regurgitation despite bicuspid appearance.    QUANTITATIVE DATA SUMMARY:  2D MEASUREMENTS:                           Normal Ranges:  IVSd:          0.69 cm   (0.6-1.1cm)  LVPWd:         0.67 cm   (0.6-1.1cm)  LVIDd:         4.26 cm   (3.9-5.9cm)  LV Mass Index: 43.5 g/m2    LA VOLUME:                                Normal Ranges:  LA Vol A4C:        73.8 ml    (22+/-6mL/m2)  LA Vol A2C:        105.2 ml  LA Vol BP:         88.1 ml  LA Vol Index A4C:  38.5ml/m2  LA Vol Index A2C:  54.9 ml/m2  LA Vol Index BP:   46.0 ml/m2  LA Area A4C:       23.2 cm2  LA Area A2C:       27.7 cm2  LA Major Axis A4C: 6.2 cm  LA Major Axis A2C: 6.2 cm  LA Volume Index:   43.3 ml/m2  LA Vol A4C:        70.4 ml  LA Vol A2C:        98.3 ml  LA Vol Index BSA:  44.0 ml/m2    LV SYSTOLIC FUNCTION BY 2D PLANIMETRY (MOD):                       Normal Ranges:  EF-A4C View:    48 % (>=55%)  EF-A2C View:    51 %  EF-Biplane:     51 %  EF-Visual:      58 %  LV EF Reported: 58 %    LV DIASTOLIC FUNCTION:                          Normal Ranges:  MV Peak E:    0.98 m/s  (0.7-1.2 m/s)  MV Peak A:    1.32 m/s  (0.42-0.7 m/s)  E/A Ratio:    0.74      (1.0-2.2)  MV e'         0.074 m/s (>8.0)  MV lateral e' 0.10 m/s  MV medial e'  0.05 m/s  E/e' Ratio:   13.14     (<8.0)  a'            0.10 m/s    MITRAL VALVE:                  Normal Ranges:  MV DT: 169 msec (150-240msec)    AORTIC VALVE:                                    Normal Ranges:  AoV Vmax:                1.36 m/s (<=1.7m/s)  AoV Peak P.4 mmHg (<20mmHg)  AoV Mean P.0 mmHg (1.7-11.5mmHg)  LVOT Max John:            0.98 m/s (<=1.1m/s)  AoV VTI:                 24.50 cm (18-25cm)  LVOT VTI:                22.50 cm  LVOT Diameter:           2.00 cm  (1.8-2.4cm)  AoV Area, VTI:           2.89 cm2 (2.5-5.5cm2)  AoV  Area,Vmax:           2.26 cm2 (2.5-4.5cm2)  AoV Dimensionless Index: 0.92       RIGHT VENTRICLE:  RV Basal 2.69 cm  RV Mid   2.09 cm  RV Major 7.2 cm  TAPSE:   32.0 mm  RV s'    0.13 m/s    TRICUSPID VALVE/RVSP:                              Normal Ranges:  Peak TR Velocity: 2.36 m/s  RV Syst Pressure: 30.3 mmHg (< 30mmHg)  IVC Diam:         2.07 cm    PULMONIC VALVE:                       Normal Ranges:  PV Max John: 0.9 m/s  (0.6-0.9m/s)  PV Max PG:  3.2 mmHg       [id number] Luis Alfonso DO  Electronically signed on 9/5/2024 at 9:49:48 AM       ** Final **  ECG 12 lead  Normal sinus rhythm  Nonspecific ST and T wave abnormality  Abnormal ECG  No previous ECGs available  Confirmed by Jesse Linder ([id number]) on 9/5/2024 9:47:02 AM      Physical Exam  Constitutional:       Appearance: Normal appearance.   HENT:      Head: Normocephalic and atraumatic.      Nose: Nose normal.      Mouth/Throat:      Mouth: Mucous membranes are moist.      Pharynx: Oropharynx is clear.   Eyes:      Extraocular Movements: Extraocular movements intact.      Pupils: Pupils are equal, round, and reactive to light.   Cardiovascular:      Rate and Rhythm: Normal rate and regular rhythm.   Pulmonary:      Effort: Pulmonary effort is normal.      Breath sounds: Normal breath sounds.   Abdominal:      General: Bowel sounds are normal.      Palpations: Abdomen is soft.   Musculoskeletal:         General: Normal range of motion.      Cervical back: Normal range of motion and neck supple.   Skin:     General: Skin is warm.   Neurological:      General: No focal deficit present.      Mental Status: He is alert and oriented to person, place, and time.   Psychiatric:         Mood and Affect: Mood normal.         Behavior: Behavior normal.         Relevant Results               Assessment/Plan        Altered mental status:  etiology still under investigation. Only findings for now is UTI. MRI of the brain is pending. He is already on Plavix and lipitor.  Appreciate neuro recommendations.  Left leg weakness in patient with hx of CVA:  work up for stroke in progress. Neuro following.  Hypertension Essential:  patient on metoprolol. BP elevated. Will start pursuing BP control. If patient's symptoms is due to stroke, it has already exceeded 24 hour window. On Metoprolol. Resume home meds  Diabetes mellitus type 2: on Metformin and lantus at home. Held metformin. Continue lantus at lower dose. A1c is 6.3, which is good control. Continue current lantus. Diabetic diet  Elevated Trop:  suspects demand ischemia. Seen and evaluated by Cardiology. Continue to monitor. Echo appears to be overall normal  Hx of CVA: PT/OT. Continue secondary prophylaxis  Fall:  related to #1 and 2.  UTI:  continue Ceftriaxone while awaiting for urine culture and sensitivity.     Eduardo Ortega MD

## 2024-09-05 NOTE — CONSULTS
Inpatient consult to Neurology  Consult performed by: Christophe Wills MD  Consult ordered by: Eduardo Ortega MD      History Of Present Illness  Gabriel Skinenr is a 74 y.o. male presenting with stroke rule out.    He has a history of stroke on Plavix.  He fell over a curb and was confused and noted to have left-sided weakness.  NIH was 4 at the time of virtual stroke call, last known well was 9 AM yesterday.  He has not MRS at baseline of 3.    Is hypertensive on admission, EKG was sinus.  Troponin elevated.  I personally reviewed his CT head which shows no acute infarct.  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  CT cervical spine unremarkable per radiology.    Past Medical History  No past medical history on file.  Surgical History  No past surgical history on file.  Social History     Allergies  Patient has no known allergies.  Home Medications  (Not in a hospital admission)      Review of Systems  Neurological Exam    General Appearance:  No distress, alert, interactive and cooperative.   Neurological:  Mental status:  Somewhat confused, gets details wrong, new 2000 but not 2024, could remember the first name of the president but not the last.  He does not recall very well 1/3  Cranial nerves:  CN 2   R HH  CN 3, 4, 6   Pupils round, 2 mm in diameter, equally reactive to light.   Lids symmetric; no ptosis.   EOMs normal alignment, full range, with normal pursuit and convergence  No nystagmus.   CN 5   Facial sensation intact bilaterally.   Jaw opening 5/5   CN 7   Normal and symmetric facial strength. Nasolabial folds symmetric.   Able to lift eyebrows and close eye lids with eye lashes buried symmetrically.   CN 8   Hearing intact to conversation.     CN 9, 10   Palate elevates symmetrically.   Phonation within normal limits, no dysarthria.   CN 11   Normal strength of shoulder shrug and neck  turning.   CN 12   Tongue midline, with normal bulk and strength; no fasciculations.     Motor:   Muscle bulk: Normal throughout.  Muscle tone: Normal in both upper and lower extremities.  Movements: No fasciculations, tremors, or other abnormal movement.   Manual Muscle Testing (MMT) reveals the following MRC grades:  R L   Shoulder abduction  5 5  Elbow flexion   5 5  Elbow extension  5 5  Finger flexion   5 5  Finger extension  5 5  Finger abduction  5 5  Thumb abduction   5 5  Hip flexion   5 3  Hip extension   5 3  Hip abduction    5 3  Hip adduction    5 3  Knee flexion   5 5  Knee extension  5 5  Ankle dorsiflexion  5 5-  Ankle plantarflexion  5 5  Ankle Inversion   5 5-  Ankle Eversion   5 5-  Big toe extension  5 5-  Toe flexion   5 5-    Reflexes:     0 to trace throughout upper and lower extremities    Babinski: Toes are down going    Sensory:   In both upper and lower extremities, sensation was intact to light touch    Coordination:    In both upper extremities, finger-nose-finger was intact without dysmetria or overshoot.     Gait:   Deferred    Physical Exam  Last Recorded Vitals  Blood pressure (!) 164/97, pulse 99, temperature 36.7 °C (98.1 °F), resp. rate 18, height 1.829 m (6'), weight 70.8 kg (156 lb), SpO2 98%.         Assessment/Plan   Gabriel Skinner presents after a fall yesterday, associated with reportedly new left leg weakness and confusion.  I was unable to reach anyone for collateral information. CT head which shows no acute infarct.  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  His examination reveals weakness and an unusual pattern including proximal left leg muscles in hip flexion extension, knee extension, and leg abduction adduction.  There is no sensory complaint.  Reflexes are diffusely diminished however he has diabetes unlikely neuropathy.  This weakness could represent  a L2-4 polyradiculopathy or lumbosacral pathology.  However given his known cerebrovascular history, a stroke should be ruled out.  I agree with a stepwise approach to assessing this.    Plan:  -Agree with brain MRI  -Infectious metabolic workup  -I will need to speak with family or someone who can confirm his baseline mental status  -Depending on the results of above may request additional testing      Medical decision making is high complexity.  He has acute neuropathy neurologic change. I personally reviewed his CT scans.     Christophe Wills MD

## 2024-09-05 NOTE — CONSULTS
Nutrition Assessement Note    Nutrition Assessment    Reason for Assessment: Admission nursing screening (MST 4)    Reason for Hospital Admission:  Gabriel Skinner is a 74 y.o. male who is admitted for left leg weakness. Admitted after mechanical fall. Hx of CVA, MRI pending. Patient reports 30# weight loss in past month due to diabetes medication and subsequent poor appetite. States he has been taken off the medicine and appetite is beginning to return. Agreeable to Mighty Shakes TID.    Past Medical History:   Diagnosis Date    Diabetes mellitus (Multi)     Hypertension     Stroke (Multi)       History reviewed. No pertinent surgical history.    Nutrition History:     Energy Intake: Fair 50-75 %  Food Allergies/Intolerances:  None  GI Symptoms: None  Oral Problems: None    Anthropometrics:  Ht: 182.9 cm (6'), Wt: 70.8 kg (156 lb), BMI: 21.15  IBW/kg (Dietitian Calculated): 80.9 kg  Percent of IBW: 88 %     Weight Change:  Daily Weight  09/04/24 : 70.8 kg (156 lb)     Weight History / % Weight Change: patient reports 30# (16.1%) weight loss in past month  Significant Weight Loss: Yes  Interpretation of Weight Loss: >5% in 1 month     Nutrition Focused Physical Exam Findings:   Subcutaneous Fat Loss  Orbital Fat Pads: Mild-Moderate (slight dark circles and slight hollowing)  Buccal Fat Pads: Mild-Moderate (flat cheeks, minimal bounce)  Triceps: Mild-Moderate (less than ample fat tissue)    Muscle Wasting  Temporalis: Mild-Moderate (slight depression)  Pectoralis (Clavicular Region): Mild-Moderate (some protrusion of clavicle)  Deltoid/Trapezius: Mild-Moderate (slight protrusion of acromion process)  Interosseous: Well nourished (muscle bulges)  Gastrocnemius: Well nourished (well developed bulbous muscle)    Nutrition Significant Labs:  Lab Results   Component Value Date    WBC 5.6 09/04/2024    HGB 11.1 (L) 09/04/2024    HCT 33.7 (L) 09/04/2024     09/04/2024    ALT 8 09/04/2024    AST 20 09/04/2024    NA  140 09/04/2024    K 3.5 09/04/2024     09/04/2024    CREATININE 1.00 09/04/2024    BUN 28 (H) 09/04/2024    CO2 25 09/04/2024    TSH 2.96 09/04/2024    INR 1.1 09/05/2024    HGBA1C 6.3 (H) 09/04/2024     Nutrition Specific Medications:  atorvastatin, 40 mg, oral, Daily  cefTRIAXone, 1 g, intravenous, q24h  clopidogrel, 75 mg, oral, Daily  finasteride, 5 mg, oral, Daily  insulin glargine, 20 Units, subcutaneous, Nightly  metoprolol tartrate, 25 mg, oral, BID  perflutren lipid microspheres, 0.5-10 mL of dilution, intravenous, Once in imaging         Dietary Orders (From admission, onward)       Start     Ordered    09/05/24 1406  Oral nutritional supplements  Until discontinued        Comments: Any flavor   Question Answer Comment   Deliver with All meals    Select supplement: Pro-Stat Sugar Free        09/05/24 1406    09/04/24 1709  Adult diet Regular, Consistent Carb, Cardiac; CCD 60 gm/meal; 70 gm fat; 2 - 3 grams Sodium  Diet effective now        Question Answer Comment   Diet type Regular    Diet type Consistent Carb    Diet type Cardiac    Carb diet selection: CCD 60 gm/meal    Fat restriction: 70 gm fat    Sodium restriction: 2 - 3 grams Sodium        09/04/24 1709                  Estimated Needs:   Estimated Energy Needs  Total Energy Estimated Needs (kCal): 2130 kCal  Total Estimated Energy Need per Day (kCal/kg): 30 kCal/kg  Method for Estimating Needs: Actual Wt    Estimated Protein Needs  Total Protein Estimated Needs (g):  ()  Total Protein Estimated Needs (g/kg):  (1.2-1.5)  Method for Estimating Needs: Actual Wt    Estimated Fluid Needs  Total Fluid Estimated Needs (mL): 2130 mL  Method for Estimating Needs: 1 mL/kcal      Nutrition Diagnosis   Nutrition Diagnosis:  Malnutrition Diagnosis  Patient has Malnutrition Diagnosis: Yes  Diagnosis Status: New  Malnutrition Diagnosis: Severe malnutrition related to acute disease or injury  As Evidenced by: 16.1% weight loss in one month, <75% po  intake for 1 month, moderate subcutaneous fat and muscle losses        Nutrition Interventions/Recommendations   Nutrition Interventions and Recommendations:    Nutrition Prescription:  Individualized Nutrition Prescription Provided for : 2130 calories,  gm protein to be provided via diet/nutritional supplements    Nutrition Interventions:   Food and/or Nutrient Delivery Interventions  Interventions: Meals and snacks, Medical food supplement  Meals and Snacks: Carbohydrate-modified diet, Fat-modified diet, Mineral-modified diet  Goal: provide as ordered  Medical Food Supplement: Modified beverage  Goal: mighty shake TID to provide 200 kcals and 7g protein each    Education Documentation  No documentation found.         Nutrition Monitoring and Evaluation   Monitoring/Evaluation:   Food/Nutrient Related History Monitoring  Monitoring and Evaluation Plan: Energy intake  Energy Intake: Estimated energy intake  Criteria: pt to consume >/= 75% estimated needs    Body Composition/Growth/Weight History  Monitoring and Evaluation Plan: Weight  Weight: Measured weight  Criteria: pt will maintain/gain wt       Time Spent/Follow-up:   Follow Up  Time Spent (min): 30 minutes  Last Date of Nutrition Visit: 09/05/24  Nutrition Follow-Up Needed?: 3-5 days  Follow up Comment: 9/9/24

## 2024-09-06 ENCOUNTER — APPOINTMENT (OUTPATIENT)
Dept: RADIOLOGY | Facility: HOSPITAL | Age: 74
DRG: 689 | End: 2024-09-06
Payer: MEDICARE

## 2024-09-06 PROBLEM — I10 PRIMARY HYPERTENSION: Status: ACTIVE | Noted: 2024-09-06

## 2024-09-06 PROBLEM — E11.9 TYPE 2 DIABETES MELLITUS (MULTI): Status: ACTIVE | Noted: 2024-09-06

## 2024-09-06 PROBLEM — N30.00 ACUTE CYSTITIS WITHOUT HEMATURIA: Status: ACTIVE | Noted: 2024-09-06

## 2024-09-06 PROBLEM — R41.0 DISORIENTATION: Status: ACTIVE | Noted: 2024-09-06

## 2024-09-06 LAB
ANION GAP SERPL CALC-SCNC: 11 MMOL/L
BACTERIA UR CULT: ABNORMAL
BUN SERPL-MCNC: 24 MG/DL (ref 8–25)
CALCIUM SERPL-MCNC: 8.8 MG/DL (ref 8.5–10.4)
CHLORIDE SERPL-SCNC: 106 MMOL/L (ref 97–107)
CO2 SERPL-SCNC: 25 MMOL/L (ref 24–31)
CREAT SERPL-MCNC: 0.7 MG/DL (ref 0.4–1.6)
EGFRCR SERPLBLD CKD-EPI 2021: >90 ML/MIN/1.73M*2
ERYTHROCYTE [DISTWIDTH] IN BLOOD BY AUTOMATED COUNT: 14.8 % (ref 11.5–14.5)
GLUCOSE BLD MANUAL STRIP-MCNC: 103 MG/DL (ref 74–99)
GLUCOSE BLD MANUAL STRIP-MCNC: 116 MG/DL (ref 74–99)
GLUCOSE BLD MANUAL STRIP-MCNC: 186 MG/DL (ref 74–99)
GLUCOSE BLD MANUAL STRIP-MCNC: 187 MG/DL (ref 74–99)
GLUCOSE BLD MANUAL STRIP-MCNC: 205 MG/DL (ref 74–99)
GLUCOSE SERPL-MCNC: 58 MG/DL (ref 65–99)
HCT VFR BLD AUTO: 33.2 % (ref 41–52)
HGB BLD-MCNC: 11.2 G/DL (ref 13.5–17.5)
MCH RBC QN AUTO: 29.9 PG (ref 26–34)
MCHC RBC AUTO-ENTMCNC: 33.7 G/DL (ref 32–36)
MCV RBC AUTO: 89 FL (ref 80–100)
NRBC BLD-RTO: 0 /100 WBCS (ref 0–0)
PLATELET # BLD AUTO: 208 X10*3/UL (ref 150–450)
POTASSIUM SERPL-SCNC: 3 MMOL/L (ref 3.4–5.1)
RBC # BLD AUTO: 3.74 X10*6/UL (ref 4.5–5.9)
SODIUM SERPL-SCNC: 142 MMOL/L (ref 133–145)
WBC # BLD AUTO: 6.7 X10*3/UL (ref 4.4–11.3)

## 2024-09-06 PROCEDURE — 2500000001 HC RX 250 WO HCPCS SELF ADMINISTERED DRUGS (ALT 637 FOR MEDICARE OP): Performed by: INTERNAL MEDICINE

## 2024-09-06 PROCEDURE — 97166 OT EVAL MOD COMPLEX 45 MIN: CPT | Mod: GO

## 2024-09-06 PROCEDURE — 2500000004 HC RX 250 GENERAL PHARMACY W/ HCPCS (ALT 636 FOR OP/ED): Mod: JZ | Performed by: NURSE PRACTITIONER

## 2024-09-06 PROCEDURE — 36415 COLL VENOUS BLD VENIPUNCTURE: CPT | Performed by: INTERNAL MEDICINE

## 2024-09-06 PROCEDURE — 97162 PT EVAL MOD COMPLEX 30 MIN: CPT | Mod: GP

## 2024-09-06 PROCEDURE — 2550000001 HC RX 255 CONTRASTS: Performed by: STUDENT IN AN ORGANIZED HEALTH CARE EDUCATION/TRAINING PROGRAM

## 2024-09-06 PROCEDURE — 2500000002 HC RX 250 W HCPCS SELF ADMINISTERED DRUGS (ALT 637 FOR MEDICARE OP, ALT 636 FOR OP/ED): Performed by: NURSE PRACTITIONER

## 2024-09-06 PROCEDURE — 99232 SBSQ HOSP IP/OBS MODERATE 35: CPT | Performed by: NURSE PRACTITIONER

## 2024-09-06 PROCEDURE — A9575 INJ GADOTERATE MEGLUMI 0.1ML: HCPCS | Performed by: STUDENT IN AN ORGANIZED HEALTH CARE EDUCATION/TRAINING PROGRAM

## 2024-09-06 PROCEDURE — 72158 MRI LUMBAR SPINE W/O & W/DYE: CPT

## 2024-09-06 PROCEDURE — 72158 MRI LUMBAR SPINE W/O & W/DYE: CPT | Performed by: RADIOLOGY

## 2024-09-06 PROCEDURE — 97530 THERAPEUTIC ACTIVITIES: CPT | Mod: GO

## 2024-09-06 PROCEDURE — 97116 GAIT TRAINING THERAPY: CPT | Mod: GP

## 2024-09-06 PROCEDURE — 99233 SBSQ HOSP IP/OBS HIGH 50: CPT | Performed by: STUDENT IN AN ORGANIZED HEALTH CARE EDUCATION/TRAINING PROGRAM

## 2024-09-06 PROCEDURE — 2060000001 HC INTERMEDIATE ICU ROOM DAILY

## 2024-09-06 PROCEDURE — 82374 ASSAY BLOOD CARBON DIOXIDE: CPT | Performed by: INTERNAL MEDICINE

## 2024-09-06 PROCEDURE — 82947 ASSAY GLUCOSE BLOOD QUANT: CPT

## 2024-09-06 PROCEDURE — 85027 COMPLETE CBC AUTOMATED: CPT | Performed by: INTERNAL MEDICINE

## 2024-09-06 RX ORDER — POTASSIUM CHLORIDE 20 MEQ/1
40 TABLET, EXTENDED RELEASE ORAL ONCE
Status: COMPLETED | OUTPATIENT
Start: 2024-09-06 | End: 2024-09-06

## 2024-09-06 RX ORDER — INSULIN LISPRO 100 [IU]/ML
0-5 INJECTION, SOLUTION INTRAVENOUS; SUBCUTANEOUS
Status: DISPENSED | OUTPATIENT
Start: 2024-09-06

## 2024-09-06 RX ORDER — GADOTERATE MEGLUMINE 376.9 MG/ML
19 INJECTION INTRAVENOUS
Status: COMPLETED | OUTPATIENT
Start: 2024-09-06 | End: 2024-09-06

## 2024-09-06 RX ORDER — INSULIN GLARGINE 100 [IU]/ML
15 INJECTION, SOLUTION SUBCUTANEOUS NIGHTLY
Status: DISPENSED | OUTPATIENT
Start: 2024-09-06

## 2024-09-06 RX ORDER — CEFAZOLIN SODIUM 1 G/50ML
1 SOLUTION INTRAVENOUS EVERY 8 HOURS
Status: DISPENSED | OUTPATIENT
Start: 2024-09-06

## 2024-09-06 ASSESSMENT — COGNITIVE AND FUNCTIONAL STATUS - GENERAL
CLIMB 3 TO 5 STEPS WITH RAILING: A LOT
MOVING FROM LYING ON BACK TO SITTING ON SIDE OF FLAT BED WITH BEDRAILS: A LITTLE
CLIMB 3 TO 5 STEPS WITH RAILING: A LITTLE
WALKING IN HOSPITAL ROOM: A LITTLE
STANDING UP FROM CHAIR USING ARMS: A LITTLE
DAILY ACTIVITIY SCORE: 17
HELP NEEDED FOR BATHING: A LOT
MOBILITY SCORE: 17
MOVING TO AND FROM BED TO CHAIR: A LITTLE
PERSONAL GROOMING: A LITTLE
TOILETING: A LITTLE
DAILY ACTIVITIY SCORE: 24
WALKING IN HOSPITAL ROOM: A LITTLE
DRESSING REGULAR LOWER BODY CLOTHING: A LOT
TURNING FROM BACK TO SIDE WHILE IN FLAT BAD: A LITTLE
MOBILITY SCORE: 22
DRESSING REGULAR UPPER BODY CLOTHING: A LITTLE

## 2024-09-06 ASSESSMENT — PAIN - FUNCTIONAL ASSESSMENT
PAIN_FUNCTIONAL_ASSESSMENT: 0-10
PAIN_FUNCTIONAL_ASSESSMENT: 0-10

## 2024-09-06 ASSESSMENT — PAIN SCALES - GENERAL
PAINLEVEL_OUTOF10: 0 - NO PAIN
PAINLEVEL_OUTOF10: 0 - NO PAIN

## 2024-09-06 ASSESSMENT — ACTIVITIES OF DAILY LIVING (ADL)
ADL_ASSISTANCE: INDEPENDENT
BATHING_ASSISTANCE: MODERATE
ADL_ASSISTANCE: INDEPENDENT

## 2024-09-06 NOTE — PROGRESS NOTES
Spiritual Care Visit    Clinical Encounter Type  Visited With: Patient, Family  Routine Visit: Introduction     Annotation:  provided patient support while rounding the Unit.  introduced  services of    explained the role of the  in providing emotional and spiritual support for patient's and family while in admitted to the hospital. No spiritual or Nondenominational needs were expressed. Spiritual care will remain available for support as requested.                                         Taxonomy  Intended Effects: Establish rapport and connectedness, Build relationship of care and support, Demonstrate caring and concern  Methods: Assist with finding purpose, Explore sid and values, Offer support  Interventions: Active listening, Ask questions to bring forth feelings, Explain  role, Identify supportive relationship(s)

## 2024-09-06 NOTE — ASSESSMENT & PLAN NOTE
PT/OT consulted   May be related to leg weakness   Neurology following   Plan for SNF at discharge

## 2024-09-06 NOTE — PROGRESS NOTES
Gabriel Skinner is a 74 y.o. male on day 2 of admission presenting with Left leg weakness.    Subjective   Patient sitting chair at bedside. Denies chest pain, shortness of breath, abdominal pain, fevers, chills. Wife at bedside, questions answered. Patient's wife states she thinks he needs more rehab and patient is agreeable to SNF placement at discharge        Objective     Physical Exam  Constitutional:       Appearance: Normal appearance.   HENT:      Head: Normocephalic and atraumatic.      Mouth/Throat:      Mouth: Mucous membranes are moist.      Pharynx: Oropharynx is clear.   Eyes:      Extraocular Movements: Extraocular movements intact.      Conjunctiva/sclera: Conjunctivae normal.      Pupils: Pupils are equal, round, and reactive to light.   Cardiovascular:      Rate and Rhythm: Normal rate and regular rhythm.      Pulses: Normal pulses.      Heart sounds: Normal heart sounds.   Pulmonary:      Effort: Pulmonary effort is normal.      Breath sounds: Normal breath sounds.   Abdominal:      General: Bowel sounds are normal.      Palpations: Abdomen is soft.      Tenderness: There is no abdominal tenderness.   Musculoskeletal:         General: Normal range of motion.      Cervical back: Normal range of motion and neck supple.   Skin:     General: Skin is warm.      Capillary Refill: Capillary refill takes less than 2 seconds.   Neurological:      Mental Status: He is alert and oriented to person, place, and time. Mental status is at baseline.   Psychiatric:         Mood and Affect: Mood normal.         Behavior: Behavior normal.         Last Recorded Vitals  Blood pressure (!) 163/91, pulse 82, temperature 35.5 °C (95.9 °F), temperature source Temporal, resp. rate 17, height 1.829 m (6'), weight 86.9 kg (191 lb 9.3 oz), SpO2 90%.  Intake/Output last 3 Shifts:  I/O last 3 completed shifts:  In: 1031.3 (11.9 mL/kg) [P.O.:490; I.V.:441.3 (5.1 mL/kg); IV Piggyback:100]  Out: 1200 (13.8 mL/kg) [Urine:1200 (0.4  mL/kg/hr)]  Weight: 86.9 kg     Relevant Results  Results for orders placed or performed during the hospital encounter of 09/04/24 (from the past 24 hour(s))   POCT GLUCOSE   Result Value Ref Range    POCT Glucose 149 (H) 74 - 99 mg/dL   CBC   Result Value Ref Range    WBC 6.7 4.4 - 11.3 x10*3/uL    nRBC 0.0 0.0 - 0.0 /100 WBCs    RBC 3.74 (L) 4.50 - 5.90 x10*6/uL    Hemoglobin 11.2 (L) 13.5 - 17.5 g/dL    Hematocrit 33.2 (L) 41.0 - 52.0 %    MCV 89 80 - 100 fL    MCH 29.9 26.0 - 34.0 pg    MCHC 33.7 32.0 - 36.0 g/dL    RDW 14.8 (H) 11.5 - 14.5 %    Platelets 208 150 - 450 x10*3/uL   Basic metabolic panel   Result Value Ref Range    Glucose 58 (L) 65 - 99 mg/dL    Sodium 142 133 - 145 mmol/L    Potassium 3.0 (L) 3.4 - 5.1 mmol/L    Chloride 106 97 - 107 mmol/L    Bicarbonate 25 24 - 31 mmol/L    Urea Nitrogen 24 8 - 25 mg/dL    Creatinine 0.70 0.40 - 1.60 mg/dL    eGFR >90 >60 mL/min/1.73m*2    Calcium 8.8 8.5 - 10.4 mg/dL    Anion Gap 11 <=19 mmol/L   POCT GLUCOSE   Result Value Ref Range    POCT Glucose 103 (H) 74 - 99 mg/dL   POCT GLUCOSE   Result Value Ref Range    POCT Glucose 205 (H) 74 - 99 mg/dL   POCT GLUCOSE   Result Value Ref Range    POCT Glucose 187 (H) 74 - 99 mg/dL   POCT GLUCOSE   Result Value Ref Range    POCT Glucose 116 (H) 74 - 99 mg/dL     MR brain wo IV contrast    Result Date: 9/5/2024  Interpreted By:  Lm Thomason, STUDY: MR BRAIN WO IV CONTRAST;  9/5/2024 9:18 pm   INDICATION: Signs/Symptoms:stroke like symptoms.   COMPARISON: CT brain 09/04/2024.   ACCESSION NUMBER(S): [id number]   ORDERING CLINICIAN: PEGGY BHATT   TECHNIQUE: Multiplanar, multi-sequence images of the brain were obtained without contrast.   FINDINGS: The craniovertebral junction is normal.   Normal morphology of midline structures.   Diffusion weighted images show no evidence of acute ischemic infarct.   Biparietal encephalomalacia, left-greater-than-right. Moderate T2/FLAIR hyperintensities within the  periventricular and subcortical white matter, nonspecific but likely reflecting sequela of small-vessel ischemic disease.   There is no evidence of an acute intraparenchymal hemorrhage, mass, mass-effect, midline shift or extra-axial fluid collection.   The ventricular size and cerebral volume are age-concordant.   The orbits and globes are unremarkable.   The paranasal sinuses show no hemorrhage or air-fluid levels.   The mastoid air cells are clear.       1. No diffusion restricting foci to suggest acute or subacute ischemia. 2. Biparietal chronic ischemic changes. 3. Nonspecific T2/FLAIR white matter hyperintensities likely reflecting sequela of small-vessel ischemic disease.       Signed by: Lm Thomason 9/5/2024 9:51 PM Dictation workstation:   [id number]    Transthoracic Echo (TTE) Complete    Result Date: 9/5/2024           [place] [address]            Phone [phone] TRANSTHORACIC ECHOCARDIOGRAM REPORT Patient Name:      ITZ LINDSAY       Reading Physician[facility]    [id number] Y[facility] Study Date:        9/5/2024              Ordering Provider:    [id number] HERMINIA TAYLOR MRN/PID:           [id number]              Fellow: Accession#:        [id number]          Nurse: Date of Birth/Age: 1950 / 74 years   Sonographer:          Iliana Oneil RDCS Gender:            M                     Additional Staff: Height:            182.88 cm             Admit Date: Weight:            70.76 kg              Admission Status:     Inpatient -                                                                Routine BSA / BMI:         1.92 m2 / 21.16 kg/m2 Department Location:  [facility] Blood Pressure: 156 /87 mmHg Study Type:    TRANSTHORACIC ECHO (TTE) COMPLETE Diagnosis/ICD:  Elevated Troponin-R79.89; Dizziness and giddiness-R42;                Syncope-R55 Indication:    elevated troponin near syncope dizziness CPT Codes:     Echo Complete w Full [id number] Patient History: Smoker:            Former. Pertinent History: CVA and Syncope. dizziness near syncope fall weak elev                    troponin ho stroke. Study Detail: The following Echo studies were performed: 2D, M-Mode, Doppler and               color flow. Technically challenging study due to prominent lung               artifact.  PHYSICIAN INTERPRETATION: Left Ventricle: The left ventricular systolic function is normal, with a visually estimated ejection fraction of 55-60%. There are no regional wall motion abnormalities. The left ventricular cavity size is normal. Left ventricular diastolic filling was indeterminate. Left Atrium: The left atrium is normal in size. Right Ventricle: The right ventricle is normal in size. There is normal right ventricular global systolic function. Right Atrium: The right atrium is normal in size. Aortic Valve: The aortic valve appears abnormal. The aortic valve appears bicuspid. The aortic valve dimensionless index is 0.92. There is no evidence of aortic valve regurgitation. The peak instantaneous gradient of the aortic valve is 7.4 mmHg. The mean gradient of the aortic valve is 4.0 mmHg. Mitral Valve: The mitral valve is normal in structure. There is no evidence of mitral valve regurgitation. Tricuspid Valve: The tricuspid valve is structurally normal. No evidence of tricuspid regurgitation. Pulmonic Valve: The pulmonic valve is not well visualized. The pulmonic valve regurgitation was not well visualized. Pericardium: There is no pericardial effusion noted. Aorta: The aortic root is normal.  CONCLUSIONS:  1. The left ventricular systolic function is normal, with a visually estimated ejection fraction of 55-60%.  2. Left ventricular diastolic filling was indeterminate.  3. There is normal  right ventricular global systolic function.  4. The aortic valve appears bicuspid.  5. No aortic valve stenosis or regurgitation despite bicuspid appearance. QUANTITATIVE DATA SUMMARY: 2D MEASUREMENTS:                          Normal Ranges: IVSd:          0.69 cm   (0.6-1.1cm) LVPWd:         0.67 cm   (0.6-1.1cm) LVIDd:         4.26 cm   (3.9-5.9cm) LV Mass Index: 43.5 g/m2 LA VOLUME:                               Normal Ranges: LA Vol A4C:        73.8 ml    (22+/-6mL/m2) LA Vol A2C:        105.2 ml LA Vol BP:         88.1 ml LA Vol Index A4C:  38.5ml/m2 LA Vol Index A2C:  54.9 ml/m2 LA Vol Index BP:   46.0 ml/m2 LA Area A4C:       23.2 cm2 LA Area A2C:       27.7 cm2 LA Major Axis A4C: 6.2 cm LA Major Axis A2C: 6.2 cm LA Volume Index:   43.3 ml/m2 LA Vol A4C:        70.4 ml LA Vol A2C:        98.3 ml LA Vol Index BSA:  44.0 ml/m2 LV SYSTOLIC FUNCTION BY 2D PLANIMETRY (MOD):                      Normal Ranges: EF-A4C View:    48 % (>=55%) EF-A2C View:    51 % EF-Biplane:     51 % EF-Visual:      58 % LV EF Reported: 58 % LV DIASTOLIC FUNCTION:                         Normal Ranges: MV Peak E:    0.98 m/s  (0.7-1.2 m/s) MV Peak A:    1.32 m/s  (0.42-0.7 m/s) E/A Ratio:    0.74      (1.0-2.2) MV e'         0.074 m/s (>8.0) MV lateral e' 0.10 m/s MV medial e'  0.05 m/s E/e' Ratio:   13.14     (<8.0) a'            0.10 m/s MITRAL VALVE:                 Normal Ranges: MV DT: 169 msec (150-240msec) AORTIC VALVE:                                   Normal Ranges: AoV Vmax:                1.36 m/s (<=1.7m/s) AoV Peak P.4 mmHg (<20mmHg) AoV Mean P.0 mmHg (1.7-11.5mmHg) LVOT Max John:            0.98 m/s (<=1.1m/s) AoV VTI:                 24.50 cm (18-25cm) LVOT VTI:                22.50 cm LVOT Diameter:           2.00 cm  (1.8-2.4cm) AoV Area, VTI:           2.89 cm2 (2.5-5.5cm2) AoV Area,Vmax:           2.26 cm2 (2.5-4.5cm2) AoV Dimensionless Index: 0.92  RIGHT VENTRICLE: RV Basal 2.69 cm RV  Mid   2.09 cm RV Major 7.2 cm TAPSE:   32.0 mm RV s'    0.13 m/s TRICUSPID VALVE/RVSP:                             Normal Ranges: Peak TR Velocity: 2.36 m/s RV Syst Pressure: 30.3 mmHg (< 30mmHg) IVC Diam:         2.07 cm PULMONIC VALVE:                      Normal Ranges: PV Max John: 0.9 m/s  (0.6-0.9m/s) PV Max PG:  3.2 mmHg  [id number] Luis Alfonso DO Electronically signed on 9/5/2024 at 9:49:48 AM  ** Final **                Assessment/Plan   Assessment & Plan  Left leg weakness  CT with no acute intracranial abnormalities   MRI with no acute or subacute stroke.   Neurology consulted   PT/OT recommending moderate intensity therapy at discharge     Frequent falls  PT/OT consulted   May be related to leg weakness   Neurology following   Plan for SNF at discharge   Elevated troponin  HS troponin elevated with down-trend on admission   Cardiology consulted   No indication of ACS   Plan for outpatient follow up with possible stress testing   Disorientation  Improving   May be related to UTI in the setting of prior stroke     Primary hypertension  Continue home metoprolol dose   Consider up-titrating as needed   Vital signs as ordered   Type 2 diabetes mellitus (Multi)  Glucose AC/HS with SSI coverage   Metformin held   Decrease daily Lantus to 15 units   Add SSI coverage   Hypoglycemia protocol   Acute cystitis without hematuria  Culture with e-coli   Stop Rocephin and start cefazolin   Continue for 3 days     DVT prophylaxis   Plavix   SCDs     Minda Dominguez, APRN-CNP

## 2024-09-06 NOTE — PROGRESS NOTES
Neurology Follow Up    Referred by: No ref. provider found, PCP: Alex White, APRN-CNP    Subjective:    Wife provided additional information today:  Started Trulicity and started getting dizzy spells and low glucose (70s), lost 30 lbs, and became weak. Left leg became weak around 1 week ago. He had bad pain in the left hip.     In the car after he fell he was starring not responsive for about 1 minute. Then went back to normal.     Objective   Blood pressure (!) 163/91, pulse 82, temperature 35.5 °C (95.9 °F), temperature source Temporal, resp. rate 17, height 1.829 m (6'), weight 86.9 kg (191 lb 9.3 oz), SpO2 90%.    Neurological Exam:  General: NAD, cooperative   Neuro:  Awake alert, language normal, no dysarthria    Motor:   Muscle bulk: Normal throughout.  Muscle tone: Normal in both upper and lower extremities.  Movements: No fasciculations, tremors, or other abnormal movement.   Manual Muscle Testing (MMT) reveals the following MRC grades:  R          L   Shoulder abduction                 5          5  Elbow flexion                           5          5  Elbow extension                      5          5  Finger flexion                           5          5  Finger extension                      5          5  Finger abduction                     5          5  Thumb abduction                    5          5  Hip flexion                               5          3  Hip extension                          5          3  Hip abduction                          5          3  Hip adduction                          5          3  Knee flexion                             5          5  Knee extension                        5          5  Ankle dorsiflexion                    5          5-  Ankle plantarflexion                5          5  Ankle Inversion                        5          5-  Ankle Eversion                         5          5-  Big toe extension                    5          5-  Toe flexion                                5          5-     Reflexes:     0 to trace throughout upper and lower extremities     Babinski: Toes are down going     Sensory:   In both upper and lower extremities, sensation was intact to light touch      Reviewed relevant results, independent review of imaging:   Encounter Date: 09/04/24   ECG 12 lead   Result Value    Ventricular Rate 93    Atrial Rate 93    AK Interval 130    QRS Duration 88    QT Interval 390    QTC Calculation(Bazett) 484    P Axis 6    R Axis -10    T Axis 48    QRS Count 16    Q Onset 207    P Onset 142    P Offset 185    T Offset 402    QTC Fredericia 451    Narrative    Normal sinus rhythm  Nonspecific ST and T wave abnormality  Abnormal ECG  No previous ECGs available  Confirmed by Jesse Linder ([id number]) on 9/5/2024 9:47:02 AM      Transthoracic Echo (TTE) Complete    Result Date: 9/5/2024           [place] [address]            Phone [phone] TRANSTHORACIC ECHOCARDIOGRAM REPORT Patient Name:      ITZ LINDSAY       Reading Physician:    [id number] [facility] YANES Study Date:        9/5/2024              Ordering Provider:    [id number] HERMINIA TAYLOR MRN/PID:           [id number]              Fellow: Accession#:        [id number]          Nurse: Date of Birth/Age: 1950 / 74 years   Sonographer:          Iliana Oneil RDCS Gender:            M                     Additional Staff: Height:            182.88 cm             Admit Date: Weight:            70.76 kg              Admission Status:     Inpatient -                                                                Routine BSA / BMI:         1.92 m2 / 21.16 kg/m2 Department Location:  [facility] Blood Pressure: 156 /87 mmHg Study Type:    TRANSTHORACIC ECHO (TTE)  COMPLETE Diagnosis/ICD: Elevated Troponin-R79.89; Dizziness and giddiness-R42;                Syncope-R55 Indication:    elevated troponin near syncope dizziness CPT Codes:     Echo Complete w Full Doppler-[id number] Patient History: Smoker:            Former. Pertinent History: CVA and Syncope. dizziness near syncope fall weak elev                    troponin ho stroke. Study Detail: The following Echo studies were performed: 2D, M-Mode, Doppler and               color flow. Technically challenging study due to prominent lung               artifact.  PHYSICIAN INTERPRETATION: Left Ventricle: The left ventricular systolic function is normal, with a visually estimated ejection fraction of 55-60%. There are no regional wall motion abnormalities. The left ventricular cavity size is normal. Left ventricular diastolic filling was indeterminate. Left Atrium: The left atrium is normal in size. Right Ventricle: The right ventricle is normal in size. There is normal right ventricular global systolic function. Right Atrium: The right atrium is normal in size. Aortic Valve: The aortic valve appears abnormal. The aortic valve appears bicuspid. The aortic valve dimensionless index is 0.92. There is no evidence of aortic valve regurgitation. The peak instantaneous gradient of the aortic valve is 7.4 mmHg. The mean gradient of the aortic valve is 4.0 mmHg. Mitral Valve: The mitral valve is normal in structure. There is no evidence of mitral valve regurgitation. Tricuspid Valve: The tricuspid valve is structurally normal. No evidence of tricuspid regurgitation. Pulmonic Valve: The pulmonic valve is not well visualized. The pulmonic valve regurgitation was not well visualized. Pericardium: There is no pericardial effusion noted. Aorta: The aortic root is normal.  CONCLUSIONS:  1. The left ventricular systolic function is normal, with a visually estimated ejection fraction of 55-60%.  2. Left ventricular diastolic filling was  indeterminate.  3. There is normal right ventricular global systolic function.  4. The aortic valve appears bicuspid.  5. No aortic valve stenosis or regurgitation despite bicuspid appearance. QUANTITATIVE DATA SUMMARY: 2D MEASUREMENTS:                          Normal Ranges: IVSd:          0.69 cm   (0.6-1.1cm) LVPWd:         0.67 cm   (0.6-1.1cm) LVIDd:         4.26 cm   (3.9-5.9cm) LV Mass Index: 43.5 g/m2 LA VOLUME:                               Normal Ranges: LA Vol A4C:        73.8 ml    (22+/-6mL/m2) LA Vol A2C:        105.2 ml LA Vol BP:         88.1 ml LA Vol Index A4C:  38.5ml/m2 LA Vol Index A2C:  54.9 ml/m2 LA Vol Index BP:   46.0 ml/m2 LA Area A4C:       23.2 cm2 LA Area A2C:       27.7 cm2 LA Major Axis A4C: 6.2 cm LA Major Axis A2C: 6.2 cm LA Volume Index:   43.3 ml/m2 LA Vol A4C:        70.4 ml LA Vol A2C:        98.3 ml LA Vol Index BSA:  44.0 ml/m2 LV SYSTOLIC FUNCTION BY 2D PLANIMETRY (MOD):                      Normal Ranges: EF-A4C View:    48 % (>=55%) EF-A2C View:    51 % EF-Biplane:     51 % EF-Visual:      58 % LV EF Reported: 58 % LV DIASTOLIC FUNCTION:                         Normal Ranges: MV Peak E:    0.98 m/s  (0.7-1.2 m/s) MV Peak A:    1.32 m/s  (0.42-0.7 m/s) E/A Ratio:    0.74      (1.0-2.2) MV e'         0.074 m/s (>8.0) MV lateral e' 0.10 m/s MV medial e'  0.05 m/s E/e' Ratio:   13.14     (<8.0) a'            0.10 m/s MITRAL VALVE:                 Normal Ranges: MV DT: 169 msec (150-240msec) AORTIC VALVE:                                   Normal Ranges: AoV Vmax:                1.36 m/s (<=1.7m/s) AoV Peak P.4 mmHg (<20mmHg) AoV Mean P.0 mmHg (1.7-11.5mmHg) LVOT Max John:            0.98 m/s (<=1.1m/s) AoV VTI:                 24.50 cm (18-25cm) LVOT VTI:                22.50 cm LVOT Diameter:           2.00 cm  (1.8-2.4cm) AoV Area, VTI:           2.89 cm2 (2.5-5.5cm2) AoV Area,Vmax:           2.26 cm2 (2.5-4.5cm2) AoV Dimensionless Index: 0.92   "RIGHT VENTRICLE: RV Basal 2.69 cm RV Mid   2.09 cm RV Major 7.2 cm TAPSE:   32.0 mm RV s'    0.13 m/s TRICUSPID VALVE/RVSP:                             Normal Ranges: Peak TR Velocity: 2.36 m/s RV Syst Pressure: 30.3 mmHg (< 30mmHg) IVC Diam:         2.07 cm PULMONIC VALVE:                      Normal Ranges: PV Max John: 0.9 m/s  (0.6-0.9m/s) PV Max PG:  3.2 mmHg  [id number] Luis Alfonso DO Electronically signed on 9/5/2024 at 9:49:48 AM  ** Final **        Results from last 7 days   Lab Units 09/04/24  1421   HEMOGLOBIN A1C % 6.3*     No results found for: \"BNP\"      Assessment:   Gabriel Skinner presents after a fall yesterday, associated with reportedly new left leg weakness and confusion.  I was unable to reach anyone for collateral information. CT head which shows no acute infarct.  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  His examination reveals weakness and an unusual pattern including proximal left leg muscles in hip flexion extension, knee extension, and leg abduction adduction.  There is no sensory complaint.  Reflexes are diffusely diminished however he has diabetes unlikely neuropathy.  This weakness could represent a L2-4 polyradiculopathy or lumbosacral pathology.      I personally reviewed his MRI brain which showed his chronic infarcts, however there is no new infarct explain his persistent left weakness.  I talked with his wife at length, and she confirms that his strength returned to normal after his previous stroke, and that the left leg only became weak within the last 1 to 2 weeks.  This was occurring around the same time that he was put on Trulicity and had low blood sugars into the 60-70s, and then complained of severe pain in the left hip prior to the onset of weakness.  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  This can be further " assessed by EMG, however we can do an MRI of the lumbar spine now to rule out intraspinal pathology.    In regards to the staring confusional spell at the onset of this, the possibilities include a focal seizure, orthostatic hypotensive event keeping in mind his diabetes, or possibly a TIA however this is less likely.  I would like to proceed with the EEG to look for any epileptogenicity.    Plan:  -Routine EEG  -MRI lumbar spine  -If MRI lumbar spine is negative for obvious intraspinal pathology, he will need an EMG and extensive neuromuscular follow-up for diabetic amyotrophy.    -Also consider outpatient autonomic testing  -Regardless of above results he should follow-up with me in clinic         I spent 60 minutes in the professional and overall care of this patient.      Christophe Wills MD  Neurology and Neuromuscular Medicine   [facility]  [facility]

## 2024-09-06 NOTE — ASSESSMENT & PLAN NOTE
Glucose AC/HS with SSI coverage   Metformin held   Decrease daily Lantus to 15 units   Add SSI coverage   Hypoglycemia protocol

## 2024-09-06 NOTE — PROGRESS NOTES
Occupational Therapy    Evaluation/Treatment    Patient Name: Gabriel Skinner  MRN: [id number]  : 1950  Today's Date: 24  Time Calculation  Start Time: 822  Stop Time: 900  Time Calculation (min): 38 min       Assessment:  OT Assessment: Pt presents on eval with generalized weakness, LLE weakness/limitations, cognitive deficits noted, decreased activity tolerance, and impaired standing balance affecting his self-care and functional transfers/mobility. Pt will benefit from continued skilled OT to address these deficits.  Prognosis: Good  Evaluation/Treatment Tolerance: Patient tolerated treatment well  End of Session Communication: Bedside nurse  End of Session Patient Position: Up in chair, Alarm off, caregiver present (Needs in reach.)  OT Assessment Results: Decreased ADL status, Decreased cognition, Decreased endurance, Visual deficit, Decreased functional mobility, Decreased IADLs    Plan:  Treatment Interventions: ADL retraining, Visual perceptual retraining, Functional transfer training, UE strengthening/ROM, Endurance training, Cognitive reorientation, Patient/family training, Equipment evaluation/education, Neuromuscular reeducation, Compensatory technique education  OT Frequency: 4 times per week  OT Discharge Recommendations: Moderate intensity level of continued care  Equipment Recommended upon Discharge: Wheeled walker  OT Recommended Transfer Status: Minimal assist  OT - OK to Discharge: Yes      Subjective     General:   OT Received On: 24  General  Reason for Referral: weakness, impaired ADL's/mobility  Referred By: Eduardo Ortega MD  Past Medical History Relevant to Rehab: DM, CVA, HTN, falls  Family/Caregiver Present: Yes  Caregiver Feedback: Spouse at bedside and provided detailed info.  Co-Treatment: PT  Co-Treatment Reason: To optimize pt safety and outcomes  Prior to Session Communication: Bedside nurse  Patient Position Received: Up in chair, Alarm off, caregiver  present  General Comment: Pt is a 73 yo male admitted to the hospital with LLE weakness and difficulty ambulating per spouse.    Precautions:  Hearing/Visual Limitations: wears glasses, mildly [place]  Medical Precautions: Fall precautions    Pain:  Pain Assessment  Pain Assessment: 0-10  0-10 (Numeric) Pain Score: 0 - No pain    Objective     Cognition:  Overall Cognitive Status: Impaired  Orientation Level: Disoriented to time (Could not state the correct month or year)  Following Commands: Follows one step commands with increased time  Cognition Comments: Pt presents with intermittent delayed processing and responses.  Processing Speed: Delayed    Home Living:  Type of Home: Apartment  Lives With: Adult children (son)  Home Adaptive Equipment: Cane  Home Layout: One level  Home Access: Elevator  Bathroom Shower/Tub: Tub/shower unit  Bathroom Toilet: Standard  Bathroom Equipment: None    Prior Function:  Level of [place]: Independent with ADLs and functional transfers, Independent with homemaking with ambulation  Receives Help From: Family (Son and spouse)  ADL Assistance: Independent  Homemaking Assistance: Independent (shares with son)  Ambulatory Assistance: Independent (cane PRN)  Vocational: Retired  Hand Dominance: Right    ADL:  Eating Assistance: Independent  Eating Deficit:  (Pt feeding himself upon arrival.)  Grooming Assistance: Minimal  Grooming Deficit: Standing with assistive device, Steadying  Bathing Assistance: Moderate  UE Dressing Assistance: Stand by  UE Dressing Deficit: Verbal cueing, Setup  LE Dressing Assistance: Moderate  Toileting Assistance with Device: Minimal    Activity Tolerance:  Activity Tolerance Comments: Fair    Bed Mobility/Transfers: Bed Mobility  Bed Mobility: No    Transfers  Transfer:  (Pt completed sit<>stand with min A for balance/safety.)    Functional Mobility:  Functional Mobility  Functional Mobility Performed:  (Pt tolerated functional mobility for a household  distance using his cane initially with min A for balance/safety and then a RW with CGA.)    Standing Balance:  Static Standing Balance  Static Standing-Balance Support: Bilateral upper extremity supported  Static Standing-Level of Assistance: Contact guard    Therapy/Activity: Therapeutic Activity  Therapeutic Activity Performed:  (See transfers, functional mobility, and static standing balance. Also provided education to pt and spouse.)    Sensation:  Sensation Comment: pt denies paresthesias    Strength:  Strength Comments: [place] 4-/5    Coordination:  Coordination Comment: [place] WFL     Hand Function:  Hand Function  Gross Grasp: Functional  Coordination: Functional      Outcome Measures: [facility] Daily Activity  Putting on and taking off regular lower body clothing: A lot  Bathing (including washing, rinsing, drying): A lot  Putting on and taking off regular upper body clothing: A little  Toileting, which includes using toilet, bedpan or urinal: A little  Taking care of personal grooming such as brushing teeth: A little  Eating Meals: None  Daily Activity - Total Score: 17      Education Documentation  Home Exercise Program, taught by Kolby Chu Jr., OT at 9/6/2024 10:38 AM.  Learner: Patient  Readiness: Acceptance  Method: Explanation  Response: Needs Reinforcement    ADL Training, taught by Kolby Chu Jr. OT at 9/6/2024 10:38 AM.  Learner: Patient  Readiness: Acceptance  Method: Explanation  Response: Needs Reinforcement    Education Comments  No comments found.      Goals:  Encounter Problems       Encounter Problems (Active)       OT Goals       Pt will complete all functional transfers and mobility with mod indep using a RW or cane. (Progressing)       Start:  09/05/24    Expected End:  10/06/24            Pt will tolerate functional mobility for a household distance using a RW or cane with mod indep. (Progressing)       Start:  09/05/24    Expected End:  10/06/24            Pt will complete all ADL's  with mod indep/indep using adaptive equipment as needed. (Progressing)       Start:  09/05/24    Expected End:  10/06/24

## 2024-09-06 NOTE — ASSESSMENT & PLAN NOTE
CT with no acute intracranial abnormalities   MRI with no acute or subacute stroke.   Neurology consulted   PT/OT recommending moderate intensity therapy at discharge

## 2024-09-06 NOTE — ASSESSMENT & PLAN NOTE
HS troponin elevated with down-trend on admission   Cardiology consulted   No indication of ACS   Plan for outpatient follow up with possible stress testing

## 2024-09-06 NOTE — PROGRESS NOTES
09/06/24 1218   Discharge Planning   Expected Discharge Disposition SNF     TCC spoke to patient and spouse at bedside, updated regarding therapy recommendations. Patient agreeable to SNF at this time. List provide to review and make choices. TCC will follow for discharge planning.  Maribeth vargas   [facility]   Referral sent at this time  Precert needed prior to discharge     UPDATE 1545: [facility] able to accept DSC to start precert     ** do not discharge without speaking to care coordination**

## 2024-09-06 NOTE — PROGRESS NOTES
Gabriel Skinner is a 74 y.o. male on day 2 of admission presenting with Left leg weakness.    Subjective   Alert and oriented, denies complaints chest pain or pressure, palpitations or feeling rapid heart rate, working with therapy this morning.        Objective     Physical Exam  Vitals and nursing note reviewed.   Constitutional:       General: He is not in acute distress.     Appearance: He is not ill-appearing or toxic-appearing.   HENT:      Head: Normocephalic and atraumatic.      Nose: Nose normal.      Mouth/Throat:      Mouth: Mucous membranes are moist.      Pharynx: Oropharynx is clear.   Cardiovascular:      Rate and Rhythm: Regular rhythm. Tachycardia present.      Pulses: Normal pulses.      Heart sounds: Normal heart sounds.   Pulmonary:      Effort: Pulmonary effort is normal.      Breath sounds: Normal breath sounds.   Abdominal:      General: Bowel sounds are normal.      Palpations: Abdomen is soft.   Musculoskeletal:         General: Normal range of motion.   Skin:     General: Skin is warm and dry.      Capillary Refill: Capillary refill takes less than 2 seconds.   Neurological:      Mental Status: He is alert and oriented to person, place, and time. Mental status is at baseline.   Psychiatric:         Mood and Affect: Mood normal.         Behavior: Behavior normal.         Thought Content: Thought content normal.         Judgment: Judgment normal.         Last Recorded Vitals  Blood pressure (!) 164/95, pulse 66, temperature 36.1 °C (97 °F), temperature source Temporal, resp. rate 16, height 1.829 m (6'), weight 86.9 kg (191 lb 9.3 oz), SpO2 98%.  Intake/Output last 3 Shifts:  I/O last 3 completed shifts:  In: 1031.3 (11.9 mL/kg) [P.O.:490; I.V.:441.3 (5.1 mL/kg); IV Piggyback:100]  Out: 1200 (13.8 mL/kg) [Urine:1200 (0.4 mL/kg/hr)]  Weight: 86.9 kg     Relevant Results  Results for orders placed or performed during the hospital encounter of 09/04/24 (from the past 24 hour(s))   Protime-INR    Result Value Ref Range    Protime 11.4 9.3 - 12.7 seconds    INR 1.1 0.9 - 1.2   APTT   Result Value Ref Range    aPTT 28.5 22.0 - 32.5 seconds   Lavender Top   Result Value Ref Range    Extra Tube Hold for add-ons.    PST Top   Result Value Ref Range    Extra Tube Hold for add-ons.    Transthoracic Echo (TTE) Complete   Result Value Ref Range    AV pk will 1.36 m/s    LVOT diam 2.00 cm    AV mn grad 4.0 mmHg    MV E/A ratio 0.74     Tricuspid annular plane systolic excursion 3.2 cm    LV Biplane EF 51 %    LA vol index A/L 46.0 ml/m2    LV EF 58 %    RV free wall pk S' 13.40 cm/s    RVSP 30.3 mmHg    LVIDd 4.26 cm    AV pk grad 7.4 mmHg    Aortic Valve Area by Continuity of VTI 2.89 cm2    Aortic Valve Area by Continuity of Peak Velocity 2.26 cm2    LV A4C EF 47.9     BSA 1.9 m2   POCT GLUCOSE   Result Value Ref Range    POCT Glucose 149 (H) 74 - 99 mg/dL   CBC   Result Value Ref Range    WBC 6.7 4.4 - 11.3 x10*3/uL    nRBC 0.0 0.0 - 0.0 /100 WBCs    RBC 3.74 (L) 4.50 - 5.90 x10*6/uL    Hemoglobin 11.2 (L) 13.5 - 17.5 g/dL    Hematocrit 33.2 (L) 41.0 - 52.0 %    MCV 89 80 - 100 fL    MCH 29.9 26.0 - 34.0 pg    MCHC 33.7 32.0 - 36.0 g/dL    RDW 14.8 (H) 11.5 - 14.5 %    Platelets 208 150 - 450 x10*3/uL   Basic metabolic panel   Result Value Ref Range    Glucose 58 (L) 65 - 99 mg/dL    Sodium 142 133 - 145 mmol/L    Potassium 3.0 (L) 3.4 - 5.1 mmol/L    Chloride 106 97 - 107 mmol/L    Bicarbonate 25 24 - 31 mmol/L    Urea Nitrogen 24 8 - 25 mg/dL    Creatinine 0.70 0.40 - 1.60 mg/dL    eGFR >90 >60 mL/min/1.73m*2    Calcium 8.8 8.5 - 10.4 mg/dL    Anion Gap 11 <=19 mmol/L   POCT GLUCOSE   Result Value Ref Range    POCT Glucose 103 (H) 74 - 99 mg/dL           Assessment/Plan   Assessment & Plan  Left leg weakness    Frequent falls    Elevated troponin    Rule out CVA  Elevated high-sensitivity troponin  History of stroke  Hypertensive disorder  History of tobacco use  Atherosclerosis of cerebral arteries     Overall  impression:     9/5: As above, certainly appears to be more relative to a TIA/CVA event.  CT brain does reveal significant atherosclerosis of the cerebral arteries.  To this point there is been no evidence of arrhythmia, no atrial fibrillation or flutter.  He remains in a sinus rhythm.  His EKG does reveal a previous septal infarct which the patient was unaware of.  He reports he does not have a significant cardiac history otherwise.  Home medications are pending at this time.  Limited history is available.  Does remain with some left-sided weakness which apparently is chronic for this patient from his previous stroke.  Concerning his elevated high-sensitivity troponins, his EKG is nonischemic.  He reports no chest pain or anginal equivalents.  Although his troponins are elevated they are generally flat, not a pattern consistent with evolving acute coronary syndrome.  Elevation high-sensitivity troponin may be relative to an acute cerebral infarct.  Await results of MRI brain.  Have ordered an echocardiogram for assessment of overall ejection fraction and to assess for wall motion abnormality suggestive of progressive coronary artery disease.  Otherwise at this time the patient's blood pressure is adequate he is euvolemic on examination breathing comfortably room air.  Will follow with you.     9/6: No significant changes overnight.  Denies complaints chest pain or pressure, palpitations or feeling rapid heart rate.  On telemetry monitor remained in sinus rhythm with occasional mild sinus tachycardia this morning while working with therapy.  MRI of the brain did not reveal acute stroke or subacute stroke.  His echocardiogram reveals a preserved ejection fraction with notations of possibly a bileaflet aortic valve without stenosis or significant regurgitation.  His blood pressure is hypertensive this morning 164/95.  He has yet to receive his morning medications.  Metoprolol should help improve this as well as his  heart rate.  He is current pulse ox 98% on room air.  He has no complaints of angina or anginal equivalents.  At this point believe the patient is stable from a cardiac perspective.  Will sign off.  Patient should follow-up Dr. Benavides in the outpatient setting in next 2 to 3 weeks to discuss possible further cardiac testing including nuclear stress test.    I spent 35 minutes in the professional and overall care of this patient.      Melo Mathews, APRN-CNP

## 2024-09-06 NOTE — PROGRESS NOTES
Physical Therapy    Physical Therapy Evaluation & Treatment    Patient Name: Gabriel Skinner  MRN: [id number]  Today's Date: 9/6/2024   Time Calculation  Start Time: 0842  Stop Time: 0912  Time Calculation (min): 30 min    Assessment/Plan   PT Assessment  PT Assessment Results: Decreased strength, Impaired balance, Decreased mobility, Decreased coordination, Impaired judgement, Impaired vision  Rehab Prognosis: Good  Evaluation/Treatment Tolerance: Patient tolerated treatment well  End of Session Communication: Bedside nurse  Assessment Comment: pt would benefit from skilled therapy services and use of rolling walker; pt is a falls risk with poor insight to safety.  End of Session Patient Position: Up in chair, Alarm off, caregiver present  IP OR SWING BED PT PLAN  Inpatient or Swing Bed: Inpatient    PT Plan  Treatment/Interventions: Transfer training, Bed mobility, Gait training, Balance training, Strengthening, Endurance training, Therapeutic exercise  PT Plan: Ongoing PT  PT Frequency: 4 times per week  PT Discharge Recommendations: Moderate intensity level of continued care  Equipment Recommended upon Discharge: Wheeled walker  PT Recommended Transfer Status:  (MIN A)  PT - OK to Discharge: Yes      Subjective   General Visit Information:  General  Reason for Referral: pt is a 73 y/o male admitted with L LE weakness; pt c/o altered mental status, dizziness and sustained a fall; pt found to have a UTI; impaired mobility  Referred By: Eduardo Ortega MD  Past Medical History Relevant to Rehab: DM, CVA with Left sided weakness; HTN, falls  Family/Caregiver Present: Yes  Caregiver Feedback: Spouse at bedside and provided detailed info.  Co-Treatment: OT  Prior to Session Communication: Bedside nurse (cleared pt for therapy; RN reported recently giving pt potassium.)  Patient Position Received:  (pt sitting up in chair upon arrival)  General Comment: pt alert and pleasant; pt impulsive trying to stand without  warning    Home Living:  Home Living  Type of Home: Apartment  Lives With:  (with adult son)  Home Adaptive Equipment: Cane  Home Layout: One level  Home Access: Elevator  Bathroom Shower/Tub: Tub/shower unit  Bathroom Toilet: Standard  Home Living Comments: pt was managing well at home;    Prior Level of Function:  Prior Function Per Pt/Caregiver Report  Level of [place]: Independent with ADLs and functional transfers, Independent with homemaking with ambulation  Receives Help From:  (son and spouse can assist if needed; spouse checks in daily)  ADL Assistance: Independent  Homemaking Assistance: Independent  Ambulatory Assistance: Independent (uses cane PRN)  Vocational: Retired  Prior Function Comments: pt was managing well at home with recent falls per spouse; *spouse provides transportation    Precautions:  Precautions  Hearing/Visual Limitations: wears glasses with decreased Right peripheral vision;  mildly [place]  Medical Precautions: Fall precautions  Precautions Comment: educated and instructed pt in safety techniques during mobility        Objective   Pain:  Pain Assessment  Pain Assessment:  (0/10)    Vitals:  HR:  bpm  SaO2: 99% on room air    Cognition:  Cognition  Overall Cognitive Status: Impaired  Orientation Level: Disoriented to time  Following Commands: Follows one step commands with increased time  Processing Speed: Delayed (presented with a delay in motor processing)    General Assessments:         Activity Tolerance  Endurance:  (GOOD- activity tolerance)    Sensation  Sensation Comment: pt denies paresthesias        Coordination  Coordination Comment: mild lateral sway    Postural Control  Posture Comment: rigid trunk with forward head down posture    Static Sitting Balance  Static Sitting-Comment/Number of Minutes: GOOD+  Dynamic Sitting Balance  Dynamic Sitting-Comments: GOOD    Static Standing Balance  Static Standing-Comment/Number of Minutes: GOOD-  Dynamic Standing  Balance  Dynamic Standing-Comments: FAIR+      Functional Assessments:  Bed Mobility  Bed Mobility:  (N/A)      Transfer:  (sit <-> stand with MIN A. increased time and effort noted.)      Ambulation/Gait Training Performed:  pt amb 25' x 1 using cane with close MIN A then 25' x 1 using rolling walker with MIN A. pt presented with mild lateral sway and short inconsistent steps using cane. pt more stable using RW;         Extremity/Trunk Assessments:  RUE   RUE : Within Functional Limits  LUE   LUE: Within Functional Limits  RLE   RLE :  (WFL with 4/5 strength)  LLE   LLE :  (WFL with grossly 3-/5 strength)      Outcome Measures:  [facility] Basic Mobility  Turning from your back to your side while in a flat bed without using bedrails: A little  Moving from lying on your back to sitting on the side of a flat bed without using bedrails: A little  Moving to and from bed to chair (including a wheelchair): A little  Standing up from a chair using your arms (e.g. wheelchair or bedside chair): A little  To walk in hospital room: A little  Climbing 3-5 steps with railing: A lot  Basic Mobility - Total Score: 17      Encounter Problems       Encounter Problems (Active)       Mobility       STG - Patient will ambulate 100' x 1 using rolling walker with MOD INDEPENDENT (Progressing)       Start:  09/06/24    Expected End:  09/20/24                     PT Transfers       STG - Patient to transfer to and from sit to supine INDEPENDENTLY (Progressing)       Start:  09/06/24    Expected End:  09/20/24            STG - Patient will transfer sit to and from stand with MOD INDEPENDENT (Progressing)       Start:  09/06/24    Expected End:  09/20/24               Pain - Adult              Education Documentation  Mobility Training, taught by Tu Saldaña, PT at 9/6/2024 11:39 AM.  Learner: Patient  Readiness: Eager  Method: Explanation  Response: Verbalizes Understanding    Education Comments  No comments found.

## 2024-09-07 LAB
ANION GAP SERPL CALC-SCNC: 9 MMOL/L
BUN SERPL-MCNC: 18 MG/DL (ref 8–25)
CALCIUM SERPL-MCNC: 8.7 MG/DL (ref 8.5–10.4)
CHLORIDE SERPL-SCNC: 105 MMOL/L (ref 97–107)
CO2 SERPL-SCNC: 25 MMOL/L (ref 24–31)
CREAT SERPL-MCNC: 0.6 MG/DL (ref 0.4–1.6)
EGFRCR SERPLBLD CKD-EPI 2021: >90 ML/MIN/1.73M*2
ERYTHROCYTE [DISTWIDTH] IN BLOOD BY AUTOMATED COUNT: 15.2 % (ref 11.5–14.5)
GLUCOSE BLD MANUAL STRIP-MCNC: 123 MG/DL (ref 74–99)
GLUCOSE BLD MANUAL STRIP-MCNC: 151 MG/DL (ref 74–99)
GLUCOSE BLD MANUAL STRIP-MCNC: 176 MG/DL (ref 74–99)
GLUCOSE BLD MANUAL STRIP-MCNC: 199 MG/DL (ref 74–99)
GLUCOSE SERPL-MCNC: 128 MG/DL (ref 65–99)
HCT VFR BLD AUTO: 35.1 % (ref 41–52)
HGB BLD-MCNC: 11.4 G/DL (ref 13.5–17.5)
MCH RBC QN AUTO: 29.5 PG (ref 26–34)
MCHC RBC AUTO-ENTMCNC: 32.5 G/DL (ref 32–36)
MCV RBC AUTO: 91 FL (ref 80–100)
NRBC BLD-RTO: 0 /100 WBCS (ref 0–0)
PLATELET # BLD AUTO: 205 X10*3/UL (ref 150–450)
POTASSIUM SERPL-SCNC: 3.5 MMOL/L (ref 3.4–5.1)
RBC # BLD AUTO: 3.86 X10*6/UL (ref 4.5–5.9)
SODIUM SERPL-SCNC: 139 MMOL/L (ref 133–145)
WBC # BLD AUTO: 5.2 X10*3/UL (ref 4.4–11.3)

## 2024-09-07 PROCEDURE — 97535 SELF CARE MNGMENT TRAINING: CPT | Mod: GO,CO

## 2024-09-07 PROCEDURE — 2500000001 HC RX 250 WO HCPCS SELF ADMINISTERED DRUGS (ALT 637 FOR MEDICARE OP): Performed by: NURSE PRACTITIONER

## 2024-09-07 PROCEDURE — 2500000004 HC RX 250 GENERAL PHARMACY W/ HCPCS (ALT 636 FOR OP/ED): Mod: JZ | Performed by: NURSE PRACTITIONER

## 2024-09-07 PROCEDURE — 99232 SBSQ HOSP IP/OBS MODERATE 35: CPT | Performed by: STUDENT IN AN ORGANIZED HEALTH CARE EDUCATION/TRAINING PROGRAM

## 2024-09-07 PROCEDURE — 85027 COMPLETE CBC AUTOMATED: CPT | Performed by: NURSE PRACTITIONER

## 2024-09-07 PROCEDURE — 97110 THERAPEUTIC EXERCISES: CPT | Mod: GO,CO

## 2024-09-07 PROCEDURE — 2500000001 HC RX 250 WO HCPCS SELF ADMINISTERED DRUGS (ALT 637 FOR MEDICARE OP): Performed by: INTERNAL MEDICINE

## 2024-09-07 PROCEDURE — 2500000002 HC RX 250 W HCPCS SELF ADMINISTERED DRUGS (ALT 637 FOR MEDICARE OP, ALT 636 FOR OP/ED): Performed by: NURSE PRACTITIONER

## 2024-09-07 PROCEDURE — 80048 BASIC METABOLIC PNL TOTAL CA: CPT | Performed by: NURSE PRACTITIONER

## 2024-09-07 PROCEDURE — 36415 COLL VENOUS BLD VENIPUNCTURE: CPT | Performed by: NURSE PRACTITIONER

## 2024-09-07 PROCEDURE — 1200000002 HC GENERAL ROOM WITH TELEMETRY DAILY

## 2024-09-07 PROCEDURE — 82947 ASSAY GLUCOSE BLOOD QUANT: CPT

## 2024-09-07 RX ORDER — AMLODIPINE BESYLATE 2.5 MG/1
TABLET ORAL
Status: DISPENSED
Start: 2024-09-07 | End: 2024-09-08

## 2024-09-07 RX ORDER — CALCIUM CARBONATE 200(500)MG
500 TABLET,CHEWABLE ORAL 4 TIMES DAILY PRN
Status: DISPENSED | OUTPATIENT
Start: 2024-09-07

## 2024-09-07 RX ORDER — PANTOPRAZOLE SODIUM 40 MG/1
40 TABLET, DELAYED RELEASE ORAL DAILY
Status: DISPENSED | OUTPATIENT
Start: 2024-09-08

## 2024-09-07 RX ORDER — AMLODIPINE BESYLATE 2.5 MG/1
2.5 TABLET ORAL DAILY
Status: DISCONTINUED | OUTPATIENT
Start: 2024-09-07 | End: 2024-09-08

## 2024-09-07 ASSESSMENT — COGNITIVE AND FUNCTIONAL STATUS - GENERAL
PERSONAL GROOMING: A LITTLE
DAILY ACTIVITIY SCORE: 24
TOILETING: A LOT
WALKING IN HOSPITAL ROOM: A LITTLE
HELP NEEDED FOR BATHING: A LOT
DRESSING REGULAR UPPER BODY CLOTHING: A LITTLE
EATING MEALS: A LITTLE
DRESSING REGULAR LOWER BODY CLOTHING: A LOT
DAILY ACTIVITIY SCORE: 15
MOBILITY SCORE: 23

## 2024-09-07 ASSESSMENT — PAIN SCALES - GENERAL
PAINLEVEL_OUTOF10: 0 - NO PAIN

## 2024-09-07 ASSESSMENT — PAIN - FUNCTIONAL ASSESSMENT
PAIN_FUNCTIONAL_ASSESSMENT: FLACC (FACE, LEGS, ACTIVITY, CRY, CONSOLABILITY)
PAIN_FUNCTIONAL_ASSESSMENT: 0-10

## 2024-09-07 ASSESSMENT — ACTIVITIES OF DAILY LIVING (ADL): HOME_MANAGEMENT_TIME_ENTRY: 12

## 2024-09-07 NOTE — PROGRESS NOTES
Gabriel Skinner is a 74 y.o. male on day 3 of admission presenting with Left leg weakness.    Subjective   Patient sitting in bed. Denies chest pain, shortness of breath, abdominal pain. Wife at bedside. Awaiting tests today. Tentative plan for SNF at discharge.        Objective     Physical Exam  Constitutional:       Appearance: Normal appearance.   HENT:      Head: Normocephalic and atraumatic.      Mouth/Throat:      Mouth: Mucous membranes are moist.      Pharynx: Oropharynx is clear.   Eyes:      Extraocular Movements: Extraocular movements intact.      Conjunctiva/sclera: Conjunctivae normal.      Pupils: Pupils are equal, round, and reactive to light.   Cardiovascular:      Rate and Rhythm: Normal rate and regular rhythm.      Pulses: Normal pulses.      Heart sounds: Normal heart sounds.   Pulmonary:      Effort: Pulmonary effort is normal.      Breath sounds: Normal breath sounds.   Abdominal:      General: Bowel sounds are normal.      Palpations: Abdomen is soft.      Tenderness: There is no abdominal tenderness.   Musculoskeletal:         General: Normal range of motion.      Cervical back: Normal range of motion and neck supple.   Skin:     General: Skin is warm and dry.      Capillary Refill: Capillary refill takes less than 2 seconds.   Neurological:      General: No focal deficit present.      Mental Status: He is alert and oriented to person, place, and time.   Psychiatric:         Mood and Affect: Mood normal.         Behavior: Behavior normal.         Last Recorded Vitals  Blood pressure (!) 160/95, pulse 63, temperature 36.4 °C (97.5 °F), temperature source Oral, resp. rate 16, height 1.829 m (6'), weight 86.9 kg (191 lb 9.3 oz), SpO2 99%.  Intake/Output last 3 Shifts:  I/O last 3 completed shifts:  In: 410 (4.7 mL/kg) [P.O.:360; IV Piggyback:50]  Out: 1200 (13.8 mL/kg) [Urine:1200 (0.4 mL/kg/hr)]  Weight: 86.9 kg     Relevant Results  Results for orders placed or performed during the hospital  encounter of 09/04/24 (from the past 24 hour(s))   POCT GLUCOSE   Result Value Ref Range    POCT Glucose 116 (H) 74 - 99 mg/dL   POCT GLUCOSE   Result Value Ref Range    POCT Glucose 186 (H) 74 - 99 mg/dL   Basic Metabolic Panel   Result Value Ref Range    Glucose 128 (H) 65 - 99 mg/dL    Sodium 139 133 - 145 mmol/L    Potassium 3.5 3.4 - 5.1 mmol/L    Chloride 105 97 - 107 mmol/L    Bicarbonate 25 24 - 31 mmol/L    Urea Nitrogen 18 8 - 25 mg/dL    Creatinine 0.60 0.40 - 1.60 mg/dL    eGFR >90 >60 mL/min/1.73m*2    Calcium 8.7 8.5 - 10.4 mg/dL    Anion Gap 9 <=19 mmol/L   CBC   Result Value Ref Range    WBC 5.2 4.4 - 11.3 x10*3/uL    nRBC 0.0 0.0 - 0.0 /100 WBCs    RBC 3.86 (L) 4.50 - 5.90 x10*6/uL    Hemoglobin 11.4 (L) 13.5 - 17.5 g/dL    Hematocrit 35.1 (L) 41.0 - 52.0 %    MCV 91 80 - 100 fL    MCH 29.5 26.0 - 34.0 pg    MCHC 32.5 32.0 - 36.0 g/dL    RDW 15.2 (H) 11.5 - 14.5 %    Platelets 205 150 - 450 x10*3/uL   POCT GLUCOSE   Result Value Ref Range    POCT Glucose 123 (H) 74 - 99 mg/dL   POCT GLUCOSE   Result Value Ref Range    POCT Glucose 199 (H) 74 - 99 mg/dL     MR lumbar spine w and wo IV contrast    Result Date: 9/7/2024  Interpreted By:  Blu Keating, STUDY: MR LUMBAR SPINE W AND WO IV CONTRAST; ;  9/6/2024 8:13 pm   INDICATION: Signs/Symptoms:Lumbar radiculopathy vs left diabetic amyotrophy.     COMPARISON: None.   ACCESSION NUMBER(S): [id number]   ORDERING CLINICIAN: YOLI FLORES   TECHNIQUE: MRI of the lumbar spine was performed with the acquisition of sagittal T2, sagittal T1, sagittal STIR, axial T1, axial T2, and sagittal and axial T1 fat saturated postcontrast sequences.   Contrast: 19 mL of Dotarem was injected intravenously.   FINDINGS: This report assumes 5 non-rib bearing lumbar vertebral bodies. The lowest intervertebral disc will be labeled L5-S1.   There is grade 1 anterolisthesis at L4-L5. Vertebral body heights are maintained. There is mild intervertebral disc height narrowing at  L3-L4 and L4-L5 with disc desiccation. There are chronic degenerative endplate changes at few levels including slight osteophytic spurring. There is mild STIR hyperintense signal located within the superior endplate of L5 which is most consistent with degenerative osseous edema versus reactive hypervascularity. Marrow signal is within normal limits.   The conus medullaris terminates at the level of T12 and is unremarkable in appearance. There is no mass or abnormal enhancement located within the conus medullaris or cauda equina nerve roots.   At T12-L1, there is no posterior disc contour abnormality. There is no spinal canal stenosis or neural foraminal narrowing. There is no facet osteoarthropathy.   At L1-L2, there is a small diffuse disc bulge. There is no spinal canal stenosis. There is no striking neural foraminal narrowing or facet osteoarthropathy.   At L2-L3, there is no striking posterior disc contour abnormality. There is no spinal canal stenosis. There is extension of disc into the left neural foramen but there is no striking neural foraminal narrowing and there is no facet osteoarthropathy.   At L3-L4, there is a small diffuse disc bulge. There is no spinal canal stenosis. There is extension of disc into the bilateral neural foramina and there is minimal left neural foraminal narrowing. There is no striking facet osteoarthropathy.   At L4-L5, there is a small diffuse disc bulge, eccentric to the right, which partially effaces the right lateral recess. There is no striking spinal canal stenosis. There is extension of disc into the right neural foramen and along with facet osteoarthropathy causes mild neural foraminal narrowing. There is no striking narrowing of the left neural foramen. There is marked bilateral facet osteoarthropathy.   At L5-S1, there is no striking posterior disc contour abnormality. There is no spinal canal stenosis or neural foraminal narrowing. There is marked left facet  osteoarthropathy.       Multilevel degenerative changes of the lumbar spine without striking spinal canal stenosis at any level. There is narrowing of the right lateral recess at L4-L5.   This study was interpreted at [facility].   MACRO: None   Signed by: Blu Keating 9/7/2024 10:54 AM Dictation workstation:   [id number]    MR brain wo IV contrast    Result Date: 9/5/2024  Interpreted By:  Lm Thomason, STUDY: MR BRAIN WO IV CONTRAST;  9/5/2024 9:18 pm   INDICATION: Signs/Symptoms:stroke like symptoms.   COMPARISON: CT brain 09/04/2024.   ACCESSION NUMBER(S): [id number]   ORDERING CLINICIAN: PEGGY BHATT   TECHNIQUE: Multiplanar, multi-sequence images of the brain were obtained without contrast.   FINDINGS: The craniovertebral junction is normal.   Normal morphology of midline structures.   Diffusion weighted images show no evidence of acute ischemic infarct.   Biparietal encephalomalacia, left-greater-than-right. Moderate T2/FLAIR hyperintensities within the periventricular and subcortical white matter, nonspecific but likely reflecting sequela of small-vessel ischemic disease.   There is no evidence of an acute intraparenchymal hemorrhage, mass, mass-effect, midline shift or extra-axial fluid collection.   The ventricular size and cerebral volume are age-concordant.   The orbits and globes are unremarkable.   The paranasal sinuses show no hemorrhage or air-fluid levels.   The mastoid air cells are clear.       1. No diffusion restricting foci to suggest acute or subacute ischemia. 2. Biparietal chronic ischemic changes. 3. Nonspecific T2/FLAIR white matter hyperintensities likely reflecting sequela of small-vessel ischemic disease.       Signed by: Lm Thomason 9/5/2024 9:51 PM Dictation workstation:   [id number]       Assessment/Plan   Assessment & Plan  Left leg weakness  CT with no acute intracranial abnormalities   MRI with no acute or subacute stroke.    Neurology consulted - appreciate recs   EEG ordered.   Lumbar spine MRI with no acute stenosis noted    PT/OT recommending moderate intensity therapy at discharge     Frequent falls  PT/OT consulted   May be related to leg weakness   Neurology following   Plan for SNF at discharge   Elevated troponin  HS troponin elevated with down-trend on admission   Cardiology consulted and signed off   No indication of ACS   Plan for outpatient follow up with possible stress testing   Disorientation  Improving   May be related to UTI in the setting of prior stroke     Primary hypertension  Continue home metoprolol dose   Add low dose amlodipine   Vital signs as ordered   Type 2 diabetes mellitus (Multi)  Glucose AC/HS with SSI coverage   Metformin held   Decrease daily Lantus to 15 units   Hypoglycemia protocol   Acute cystitis without hematuria  Culture with e-coli   Continue cefazolin through 1800 9/9  If able to go to SNF prior to then, will change to PO     DVT prophylaxis   Plavix/SCDs     Minda Dominguez, APRN-CNP

## 2024-09-07 NOTE — ASSESSMENT & PLAN NOTE
Culture with e-coli   Continue cefazolin through 1800 9/9  If able to go to SNF prior to then, will change to PO

## 2024-09-07 NOTE — NURSING NOTE
Received patient up to chair conversant to family. Report done at bedside.. Not in pain or discomfort. Assessment as charted.

## 2024-09-07 NOTE — ASSESSMENT & PLAN NOTE
CT with no acute intracranial abnormalities   MRI with no acute or subacute stroke.   Neurology consulted - appreciate recs   EEG ordered.   Lumbar spine MRI with no acute stenosis noted    PT/OT recommending moderate intensity therapy at discharge

## 2024-09-07 NOTE — PROGRESS NOTES
Occupational Therapy    OT Treatment    Patient Name: Gabriel Skinner  MRN: [id number]  Today's Date: 9/7/2024  Time Calculation  Start Time: 1026  Stop Time: 1050  Time Calculation (min): 24 min        Assessment:  OT Assessment: Gradual progress made towards OT goals. Continue with current OT POC to increase strength, balance and functional tolerance to maximize safety and independence during ADLs.  Barriers to Discharge: Decreased caregiver support  Evaluation/Treatment Tolerance: Patient tolerated treatment well  End of Session Communication: Bedside nurse  End of Session Patient Position: Up in chair, Alarm on (all needs in reach)  OT Assessment Results: Decreased ADL status, Decreased cognition, Decreased endurance, Visual deficit, Decreased functional mobility, Decreased IADLs  Barriers to Discharge: Decreased caregiver support  Evaluation/Treatment Tolerance: Patient tolerated treatment well  Plan:  Treatment Interventions: ADL retraining, Visual perceptual retraining, Functional transfer training, UE strengthening/ROM, Endurance training, Cognitive reorientation, Patient/family training, Equipment evaluation/education, Neuromuscular reeducation, Compensatory technique education  OT Frequency: 4 times per week  OT Discharge Recommendations: Moderate intensity level of continued care  Equipment Recommended upon Discharge: Wheeled walker  OT Recommended Transfer Status: Minimal assist  OT - OK to Discharge: Yes  Treatment Interventions: ADL retraining, Visual perceptual retraining, Functional transfer training, UE strengthening/ROM, Endurance training, Cognitive reorientation, Patient/family training, Equipment evaluation/education, Neuromuscular reeducation, Compensatory technique education    Subjective   Previous Visit Info:  OT Last Visit  OT Received On: 09/07/24  General:  General  Reason for Referral: pt is a 75 y/o male admitted with L LE weakness; pt c/o altered mental status, dizziness and sustained a  fall; pt found to have a UTI; impaired ADLs  Past Medical History Relevant to Rehab: DM, CVA with Left sided weakness; HTN, falls  Prior to Session Communication: Bedside nurse  Patient Position Received: Bed, 3 rail up, Alarm on  General Comment: Pt cleared for OT session per nursing, cooperative with encouragement.  Precautions:  Hearing/Visual Limitations: wears glasses with decreased Right peripheral vision;  mildly [place] IRA  Medical Precautions: Fall precautions  Precautions Comment: telemetry, IV    Vital Signs (Past 2hrs)                 Pain:  Pain Assessment  Pain Assessment: 0-10  0-10 (Numeric) Pain Score: 0 - No pain  Clinical Progression: Not changed    Objective    Cognition:  Cognition  Overall Cognitive Status: Impaired  Orientation Level: Disoriented to time, Disoriented to situation  Following Commands: Follows one step commands with repetition  Cognition Comments: garbled speech  Safety/Judgement: Exceptions to WFL  Insight: Mild  Impulsive: Moderately  Task Initiation: Initiates with cues  Processing Speed: Delayed  Coordination:  Movements are Fluid and Coordinated: No  Upper Body Coordination: slower rate and accuracy of movments L>R  Lower Body Coordination: slower rate of movements L>R  Activities of Daily Living: Grooming  Grooming Comments: pt declined participation in ADLs despite education and encouragement    LE Dressing  LE Dressing: Yes  LE Dressing Comments: Brenton provided to thread LLE through pants while seated EOB, pt able to thread RLE through pants with close supervision. Once in standing close supervision provided for donning of pants over hips.  Functional Standing Tolerance:     Bed Mobility/Transfers: Bed Mobility  Bed Mobility: Yes  Bed Mobility 1  Bed Mobility 1: Supine to sitting, Scooting  Level of Assistance 1: Close supervision  Bed Mobility Comments 1: increased time and effort with use of bed rails and HOB moderately elevated    Transfers  Transfer: Yes  Transfer  1  Trials/Comments 1: sit<>stands completed from standard EOB and chair heights with FWW and CGA- min verbal/ tactile cues required for proper hand placement to increase safety and decrease risk of falls.    Therapy/Activity: Therapeutic Exercise  Therapeutic Exercise Performed: Yes  Therapeutic Exercise Activity 1: BUE exercises completed 2x10 with min resistance for following exercises: wrist flexion/ extension, pronation/supination, bicep curl, triceps extension, and shoulder press  Therapeutic Exercise Activity 2: Exercises completed this date to increase strength and functional tolerance for safety and ease of ADL/ADL transfer completion. Verbal instruction and demonstration provided to ensure proper muscle recruitment.      Outcome Measures:[facility] Daily Activity  Putting on and taking off regular lower body clothing: A lot  Bathing (including washing, rinsing, drying): A lot  Putting on and taking off regular upper body clothing: A little  Toileting, which includes using toilet, bedpan or urinal: A lot  Taking care of personal grooming such as brushing teeth: A little  Eating Meals: A little  Daily Activity - Total Score: 15        Education Documentation  Home Exercise Program, taught by CHARLA Pastrana at 9/7/2024  1:38 PM.  Learner: Patient  Readiness: Acceptance  Method: Explanation, Demonstration  Response: Needs Reinforcement    ADL Training, taught by CHARLA Pastrana at 9/7/2024  1:38 PM.  Learner: Patient  Readiness: Acceptance  Method: Explanation, Demonstration  Response: Needs Reinforcement    Education Comments  Education provided on role of OT/POC, safety awareness throughout functional tasks/transfers, importance of activity/ rest routine, EC/WS techniques, and use of call light for assistance. Questions, comments and concerns addressed regarding OT.      Goals:  Encounter Problems       Encounter Problems (Active)       OT Goals       Pt will complete all functional transfers and  mobility with mod indep using a RW or cane. (Progressing)       Start:  09/05/24    Expected End:  10/06/24            Pt will tolerate functional mobility for a household distance using a RW or cane with mod indep. (Progressing)       Start:  09/05/24    Expected End:  10/06/24            Pt will complete all ADL's with mod indep/indep using adaptive equipment as needed. (Progressing)       Start:  09/05/24    Expected End:  10/06/24

## 2024-09-07 NOTE — ASSESSMENT & PLAN NOTE
Glucose AC/HS with SSI coverage   Metformin held   Decrease daily Lantus to 15 units   Hypoglycemia protocol

## 2024-09-07 NOTE — PROGRESS NOTES
Neurology Follow Up    Referred by: No ref. provider found, PCP: Aelx White, APRN-CNP    Subjective:  Stable today    Wife provided additional information:  Started Trulicity and started getting dizzy spells and low glucose (70s), lost 30 lbs, and became weak. Left leg became weak around 1 week ago. He had bad pain in the left hip.     In the car after he fell he was starring not responsive for about 1 minute. Then went back to normal.     Objective   Blood pressure 132/78, pulse 98, temperature 36.4 °C (97.5 °F), temperature source Temporal, resp. rate 18, height 1.829 m (6'), weight 85.1 kg (187 lb 9.8 oz), SpO2 98%.    Neurological Exam:  General: NAD, cooperative   Neuro:  Awake alert, language normal, no dysarthria    Motor:   Muscle bulk: Normal throughout.  Muscle tone: Normal in both upper and lower extremities.  Movements: No fasciculations, tremors, or other abnormal movement.   Manual Muscle Testing (MMT) reveals the following MRC grades:  R          L   Shoulder abduction                 5          5  Elbow flexion                           5          5  Elbow extension                      5          5  Finger flexion                           5          5  Finger extension                      5          5  Finger abduction                     5          5  Thumb abduction                    5          5  Hip flexion                               5          3  Hip extension                          5          3  Hip abduction                          5          3  Hip adduction                          5          3  Knee flexion                             5          5  Knee extension                        5          5  Ankle dorsiflexion                    5          5-  Ankle plantarflexion                5          5  Ankle Inversion                        5          5-  Ankle Eversion                         5          5-  Big toe extension                    5          5-  Toe flexion                                5          5-     Reflexes:     0 to trace throughout upper and lower extremities     Babinski: Toes are down going     Sensory:   In both upper and lower extremities, sensation was intact to light touch      Reviewed relevant results, independent review of imaging:   Encounter Date: 09/04/24   ECG 12 lead   Result Value    Ventricular Rate 93    Atrial Rate 93    MI Interval 130    QRS Duration 88    QT Interval 390    QTC Calculation(Bazett) 484    P Axis 6    R Axis -10    T Axis 48    QRS Count 16    Q Onset 207    P Onset 142    P Offset 185    T Offset 402    QTC Fredericia 451    Narrative    Normal sinus rhythm  Nonspecific ST and T wave abnormality  Abnormal ECG  No previous ECGs available  Confirmed by Jesse Linder ([id number]) on 9/5/2024 9:47:02 AM      Transthoracic Echo (TTE) Complete    Result Date: 9/5/2024           [place] [address]            Phone [phone] TRANSTHORACIC ECHOCARDIOGRAM REPORT Patient Name:      ITZ LINDSAY       Reading Physician:    [id number] [facility] Study Date:        9/5/2024              Ordering Provider:    [id number] HERMINIA TAYLOR MRN/PID:           [id number]              Fellow: Accession#:        [id number]          Nurse: Date of Birth/Age: 1950 / 74 years   Sonographer:          Iliana Oneil RDCS Gender:            M                     Additional Staff: Height:            182.88 cm             Admit Date: Weight:            70.76 kg              Admission Status:     Inpatient -                                                                Routine BSA / BMI:         1.92 m2 / 21.16 kg/m2 Department Location:  [facility] Blood Pressure: 156 /87 mmHg Study Type:    TRANSTHORACIC ECHO (TTE)  COMPLETE Diagnosis/ICD: Elevated Troponin-R79.89; Dizziness and giddiness-R42;                Syncope-R55 Indication:    elevated troponin near syncope dizziness CPT Codes:     Echo Complete w Full [id number] Patient History: Smoker:            Former. Pertinent History: CVA and Syncope. dizziness near syncope fall weak elev                    troponin ho stroke. Study Detail: The following Echo studies were performed: 2D, M-Mode, Doppler and               color flow. Technically challenging study due to prominent lung               artifact.  PHYSICIAN INTERPRETATION: Left Ventricle: The left ventricular systolic function is normal, with a visually estimated ejection fraction of 55-60%. There are no regional wall motion abnormalities. The left ventricular cavity size is normal. Left ventricular diastolic filling was indeterminate. Left Atrium: The left atrium is normal in size. Right Ventricle: The right ventricle is normal in size. There is normal right ventricular global systolic function. Right Atrium: The right atrium is normal in size. Aortic Valve: The aortic valve appears abnormal. The aortic valve appears bicuspid. The aortic valve dimensionless index is 0.92. There is no evidence of aortic valve regurgitation. The peak instantaneous gradient of the aortic valve is 7.4 mmHg. The mean gradient of the aortic valve is 4.0 mmHg. Mitral Valve: The mitral valve is normal in structure. There is no evidence of mitral valve regurgitation. Tricuspid Valve: The tricuspid valve is structurally normal. No evidence of tricuspid regurgitation. Pulmonic Valve: The pulmonic valve is not well visualized. The pulmonic valve regurgitation was not well visualized. Pericardium: There is no pericardial effusion noted. Aorta: The aortic root is normal.  CONCLUSIONS:  1. The left ventricular systolic function is normal, with a visually estimated ejection fraction of 55-60%.  2. Left ventricular diastolic filling was  indeterminate.  3. There is normal right ventricular global systolic function.  4. The aortic valve appears bicuspid.  5. No aortic valve stenosis or regurgitation despite bicuspid appearance. QUANTITATIVE DATA SUMMARY: 2D MEASUREMENTS:                          Normal Ranges: IVSd:          0.69 cm   (0.6-1.1cm) LVPWd:         0.67 cm   (0.6-1.1cm) LVIDd:         4.26 cm   (3.9-5.9cm) LV Mass Index: 43.5 g/m2 LA VOLUME:                               Normal Ranges: LA Vol A4C:        73.8 ml    (22+/-6mL/m2) LA Vol A2C:        105.2 ml LA Vol BP:         88.1 ml LA Vol Index A4C:  38.5ml/m2 LA Vol Index A2C:  54.9 ml/m2 LA Vol Index BP:   46.0 ml/m2 LA Area A4C:       23.2 cm2 LA Area A2C:       27.7 cm2 LA Major Axis A4C: 6.2 cm LA Major Axis A2C: 6.2 cm LA Volume Index:   43.3 ml/m2 LA Vol A4C:        70.4 ml LA Vol A2C:        98.3 ml LA Vol Index BSA:  44.0 ml/m2 LV SYSTOLIC FUNCTION BY 2D PLANIMETRY (MOD):                      Normal Ranges: EF-A4C View:    48 % (>=55%) EF-A2C View:    51 % EF-Biplane:     51 % EF-Visual:      58 % LV EF Reported: 58 % LV DIASTOLIC FUNCTION:                         Normal Ranges: MV Peak E:    0.98 m/s  (0.7-1.2 m/s) MV Peak A:    1.32 m/s  (0.42-0.7 m/s) E/A Ratio:    0.74      (1.0-2.2) MV e'         0.074 m/s (>8.0) MV lateral e' 0.10 m/s MV medial e'  0.05 m/s E/e' Ratio:   13.14     (<8.0) a'            0.10 m/s MITRAL VALVE:                 Normal Ranges: MV DT: 169 msec (150-240msec) AORTIC VALVE:                                   Normal Ranges: AoV Vmax:                1.36 m/s (<=1.7m/s) AoV Peak P.4 mmHg (<20mmHg) AoV Mean P.0 mmHg (1.7-11.5mmHg) LVOT Max John:            0.98 m/s (<=1.1m/s) AoV VTI:                 24.50 cm (18-25cm) LVOT VTI:                22.50 cm LVOT Diameter:           2.00 cm  (1.8-2.4cm) AoV Area, VTI:           2.89 cm2 (2.5-5.5cm2) AoV Area,Vmax:           2.26 cm2 (2.5-4.5cm2) AoV Dimensionless Index: 0.92   "RIGHT VENTRICLE: RV Basal 2.69 cm RV Mid   2.09 cm RV Major 7.2 cm TAPSE:   32.0 mm RV s'    0.13 m/s TRICUSPID VALVE/RVSP:                             Normal Ranges: Peak TR Velocity: 2.36 m/s RV Syst Pressure: 30.3 mmHg (< 30mmHg) IVC Diam:         2.07 cm PULMONIC VALVE:                      Normal Ranges: PV Max John: 0.9 m/s  (0.6-0.9m/s) PV Max PG:  3.2 mmHg  [id number] Luis Alfonso DO Electronically signed on 9/5/2024 at 9:49:48 AM  ** Final **        Results from last 7 days   Lab Units 09/04/24  1421   HEMOGLOBIN A1C % 6.3*     No results found for: \"BNP\"      Assessment:   Gabriel Skinner presents after a fall yesterday, associated with weeks of left leg weakness and confusional episode.  CT head which shows no acute infarct.  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  I personally reviewed his MRI brain which showed his chronic infarcts, however there is no new infarct explain his persistent left weakness. His examination reveals weakness and an unusual pattern including proximal left leg muscles in hip flexion extension, knee extension, and leg abduction adduction.  There is no definite sensory complaint.  Reflexes are diffusely diminished however he has diabetes unlikely neuropathy.  This weakness could represent a L2-4 polyradiculopathy or lumbosacral pathology.      I talked with his wife at length, and she confirms that his strength returned to normal after his previous stroke, and that the left leg only became weak within the last 1 to 2 weeks.  This was occurring around the same time that he was put on Trulicity and had low blood sugars into the 60-70s, and then complained of severe pain in the left hip prior to the onset of weakness.    We performed an MRI of the lumbar spine which I personally reviewed, which does not show any significant stenosis or herniations to explain the left leg " weakness. 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    In regards to the starring confusional spell at the onset of this, the possibilities include a focal seizure, orthostatic hypotensive event, keeping in mind his diabetes, or possibly a TIA however this is less likely.  I would like to proceed with the EEG to look for any epileptogenicity.      Plan:  Routine EEG  Please arrange for him to follow-up with me in clinic, and we will perform an outpatient EMG looking for diabetic amyotrophy or other peripheral nerve lesions of the left leg.  Possible plan for outpatient autonomic testing for orthostatic hypotension and autonomic dysfunction       I spent 60 minutes in the professional and overall care of this patient.      Christophe Wills MD  Neurology and Neuromuscular Medicine   [facility]  [facility] [place]

## 2024-09-07 NOTE — ASSESSMENT & PLAN NOTE
HS troponin elevated with down-trend on admission   Cardiology consulted and signed off   No indication of ACS   Plan for outpatient follow up with possible stress testing

## 2024-09-08 VITALS
BODY MASS INDEX: 25.41 KG/M2 | HEIGHT: 72 IN | TEMPERATURE: 98.4 F | DIASTOLIC BLOOD PRESSURE: 94 MMHG | SYSTOLIC BLOOD PRESSURE: 154 MMHG | WEIGHT: 187.61 LBS | OXYGEN SATURATION: 99 % | RESPIRATION RATE: 16 BRPM | HEART RATE: 87 BPM

## 2024-09-08 LAB
ANION GAP SERPL CALC-SCNC: 10 MMOL/L
BUN SERPL-MCNC: 20 MG/DL (ref 8–25)
CALCIUM SERPL-MCNC: 8.9 MG/DL (ref 8.5–10.4)
CHLORIDE SERPL-SCNC: 106 MMOL/L (ref 97–107)
CO2 SERPL-SCNC: 25 MMOL/L (ref 24–31)
CREAT SERPL-MCNC: 0.7 MG/DL (ref 0.4–1.6)
EGFRCR SERPLBLD CKD-EPI 2021: >90 ML/MIN/1.73M*2
ERYTHROCYTE [DISTWIDTH] IN BLOOD BY AUTOMATED COUNT: 15.1 % (ref 11.5–14.5)
GLUCOSE BLD MANUAL STRIP-MCNC: 105 MG/DL (ref 74–99)
GLUCOSE BLD MANUAL STRIP-MCNC: 148 MG/DL (ref 74–99)
GLUCOSE BLD MANUAL STRIP-MCNC: 156 MG/DL (ref 74–99)
GLUCOSE BLD MANUAL STRIP-MCNC: 162 MG/DL (ref 74–99)
GLUCOSE SERPL-MCNC: 112 MG/DL (ref 65–99)
HCT VFR BLD AUTO: 33.1 % (ref 41–52)
HGB BLD-MCNC: 10.9 G/DL (ref 13.5–17.5)
MCH RBC QN AUTO: 29.9 PG (ref 26–34)
MCHC RBC AUTO-ENTMCNC: 32.9 G/DL (ref 32–36)
MCV RBC AUTO: 91 FL (ref 80–100)
NRBC BLD-RTO: 0 /100 WBCS (ref 0–0)
PLATELET # BLD AUTO: 198 X10*3/UL (ref 150–450)
POTASSIUM SERPL-SCNC: 3.5 MMOL/L (ref 3.4–5.1)
RBC # BLD AUTO: 3.64 X10*6/UL (ref 4.5–5.9)
SODIUM SERPL-SCNC: 141 MMOL/L (ref 133–145)
WBC # BLD AUTO: 5.4 X10*3/UL (ref 4.4–11.3)

## 2024-09-08 PROCEDURE — 82947 ASSAY GLUCOSE BLOOD QUANT: CPT

## 2024-09-08 PROCEDURE — 36415 COLL VENOUS BLD VENIPUNCTURE: CPT | Performed by: NURSE PRACTITIONER

## 2024-09-08 PROCEDURE — 2500000004 HC RX 250 GENERAL PHARMACY W/ HCPCS (ALT 636 FOR OP/ED): Mod: JZ | Performed by: NURSE PRACTITIONER

## 2024-09-08 PROCEDURE — 2500000001 HC RX 250 WO HCPCS SELF ADMINISTERED DRUGS (ALT 637 FOR MEDICARE OP): Performed by: NURSE PRACTITIONER

## 2024-09-08 PROCEDURE — 1200000002 HC GENERAL ROOM WITH TELEMETRY DAILY

## 2024-09-08 PROCEDURE — 80048 BASIC METABOLIC PNL TOTAL CA: CPT | Performed by: NURSE PRACTITIONER

## 2024-09-08 PROCEDURE — 85027 COMPLETE CBC AUTOMATED: CPT | Performed by: NURSE PRACTITIONER

## 2024-09-08 PROCEDURE — 2500000002 HC RX 250 W HCPCS SELF ADMINISTERED DRUGS (ALT 637 FOR MEDICARE OP, ALT 636 FOR OP/ED): Performed by: NURSE PRACTITIONER

## 2024-09-08 RX ORDER — AMLODIPINE BESYLATE 2.5 MG/1
2.5 TABLET ORAL DAILY
Status: ACTIVE | OUTPATIENT
Start: 2024-09-08

## 2024-09-08 ASSESSMENT — COGNITIVE AND FUNCTIONAL STATUS - GENERAL
MOBILITY SCORE: 22
CLIMB 3 TO 5 STEPS WITH RAILING: A LITTLE
DAILY ACTIVITIY SCORE: 24
WALKING IN HOSPITAL ROOM: A LITTLE

## 2024-09-08 ASSESSMENT — PAIN SCALES - WONG BAKER: WONGBAKER_NUMERICALRESPONSE: NO HURT

## 2024-09-08 ASSESSMENT — PAIN SCALES - GENERAL: PAINLEVEL_OUTOF10: 0 - NO PAIN

## 2024-09-08 NOTE — ASSESSMENT & PLAN NOTE
PT/OT consulted   May be related to leg weakness   Neurology following   Plan for [facility] at discharge. Precert good through 9/13

## 2024-09-08 NOTE — PROGRESS NOTES
24 1457   Discharge Planning   Expected Discharge Disposition SNF  ([facility])     Precert approved for [facility] and will  . Minda LOPEZ aware.  Patient not medically ready for discharge today.  Plan is for EEG tomorrow.     PLEASE NOTIFY CARE COORDINATION PRIOR TO DISCHARGE .  WILL NEED 1040

## 2024-09-08 NOTE — CARE PLAN
Over the shift, the patient did  make progress toward the following goals.         Problem: Fall/Injury  Goal: Not fall by end of shift  Outcome: Progressing

## 2024-09-08 NOTE — PROGRESS NOTES
Gabriel Skinner is a 74 y.o. male on day 4 of admission presenting with Left leg weakness.    Subjective   Patient resting in bed. Denies chest pain, shortness of breath, abdominal pain, fevers, chills. We reviewed plan for EEG tomorrow and SNF placement and patient agreeable.        Objective     Physical Exam  Constitutional:       Appearance: Normal appearance.   HENT:      Head: Normocephalic and atraumatic.      Mouth/Throat:      Mouth: Mucous membranes are moist.      Pharynx: Oropharynx is clear.   Eyes:      Extraocular Movements: Extraocular movements intact.      Conjunctiva/sclera: Conjunctivae normal.      Pupils: Pupils are equal, round, and reactive to light.   Cardiovascular:      Rate and Rhythm: Normal rate and regular rhythm.      Pulses: Normal pulses.      Heart sounds: Normal heart sounds.   Pulmonary:      Effort: Pulmonary effort is normal.      Breath sounds: Normal breath sounds.   Abdominal:      General: Bowel sounds are normal.      Palpations: Abdomen is soft.      Tenderness: There is no abdominal tenderness.   Musculoskeletal:         General: Normal range of motion.      Cervical back: Normal range of motion and neck supple.   Skin:     General: Skin is warm and dry.      Capillary Refill: Capillary refill takes less than 2 seconds.   Neurological:      General: No focal deficit present.      Mental Status: He is alert and oriented to person, place, and time.   Psychiatric:         Mood and Affect: Mood normal.         Behavior: Behavior normal.         Last Recorded Vitals  Blood pressure 131/79, pulse 83, temperature 36.7 °C (98.1 °F), temperature source Oral, resp. rate 17, height 1.829 m (6'), weight 85.1 kg (187 lb 9.8 oz), SpO2 100%.  Intake/Output last 3 Shifts:  I/O last 3 completed shifts:  In: 870 (10.2 mL/kg) [P.O.:820; IV Piggyback:50]  Out: 500 (5.9 mL/kg) [Urine:500 (0.2 mL/kg/hr)]  Weight: 85.1 kg     Relevant Results  Results for orders placed or performed during the  hospital encounter of 09/04/24 (from the past 24 hour(s))   POCT GLUCOSE   Result Value Ref Range    POCT Glucose 151 (H) 74 - 99 mg/dL   POCT GLUCOSE   Result Value Ref Range    POCT Glucose 176 (H) 74 - 99 mg/dL   Basic Metabolic Panel   Result Value Ref Range    Glucose 112 (H) 65 - 99 mg/dL    Sodium 141 133 - 145 mmol/L    Potassium 3.5 3.4 - 5.1 mmol/L    Chloride 106 97 - 107 mmol/L    Bicarbonate 25 24 - 31 mmol/L    Urea Nitrogen 20 8 - 25 mg/dL    Creatinine 0.70 0.40 - 1.60 mg/dL    eGFR >90 >60 mL/min/1.73m*2    Calcium 8.9 8.5 - 10.4 mg/dL    Anion Gap 10 <=19 mmol/L   CBC   Result Value Ref Range    WBC 5.4 4.4 - 11.3 x10*3/uL    nRBC 0.0 0.0 - 0.0 /100 WBCs    RBC 3.64 (L) 4.50 - 5.90 x10*6/uL    Hemoglobin 10.9 (L) 13.5 - 17.5 g/dL    Hematocrit 33.1 (L) 41.0 - 52.0 %    MCV 91 80 - 100 fL    MCH 29.9 26.0 - 34.0 pg    MCHC 32.9 32.0 - 36.0 g/dL    RDW 15.1 (H) 11.5 - 14.5 %    Platelets 198 150 - 450 x10*3/uL   POCT GLUCOSE   Result Value Ref Range    POCT Glucose 105 (H) 74 - 99 mg/dL   POCT GLUCOSE   Result Value Ref Range    POCT Glucose 162 (H) 74 - 99 mg/dL     MR lumbar spine w and wo IV contrast    Result Date: 9/7/2024  Interpreted By:  lBu Keating, STUDY: MR LUMBAR SPINE W AND WO IV CONTRAST; ;  9/6/2024 8:13 pm   INDICATION: Signs/Symptoms:Lumbar radiculopathy vs left diabetic amyotrophy.     COMPARISON: None.   ACCESSION NUMBER(S): [id number]   ORDERING CLINICIAN: YOLI FLORES   TECHNIQUE: MRI of the lumbar spine was performed with the acquisition of sagittal T2, sagittal T1, sagittal STIR, axial T1, axial T2, and sagittal and axial T1 fat saturated postcontrast sequences.   Contrast: 19 mL of Dotarem was injected intravenously.   FINDINGS: This report assumes 5 non-rib bearing lumbar vertebral bodies. The lowest intervertebral disc will be labeled L5-S1.   There is grade 1 anterolisthesis at L4-L5. Vertebral body heights are maintained. There is mild intervertebral disc height  narrowing at L3-L4 and L4-L5 with disc desiccation. There are chronic degenerative endplate changes at few levels including slight osteophytic spurring. There is mild STIR hyperintense signal located within the superior endplate of L5 which is most consistent with degenerative osseous edema versus reactive hypervascularity. Marrow signal is within normal limits.   The conus medullaris terminates at the level of T12 and is unremarkable in appearance. There is no mass or abnormal enhancement located within the conus medullaris or cauda equina nerve roots.   At T12-L1, there is no posterior disc contour abnormality. There is no spinal canal stenosis or neural foraminal narrowing. There is no facet osteoarthropathy.   At L1-L2, there is a small diffuse disc bulge. There is no spinal canal stenosis. There is no striking neural foraminal narrowing or facet osteoarthropathy.   At L2-L3, there is no striking posterior disc contour abnormality. There is no spinal canal stenosis. There is extension of disc into the left neural foramen but there is no striking neural foraminal narrowing and there is no facet osteoarthropathy.   At L3-L4, there is a small diffuse disc bulge. There is no spinal canal stenosis. There is extension of disc into the bilateral neural foramina and there is minimal left neural foraminal narrowing. There is no striking facet osteoarthropathy.   At L4-L5, there is a small diffuse disc bulge, eccentric to the right, which partially effaces the right lateral recess. There is no striking spinal canal stenosis. There is extension of disc into the right neural foramen and along with facet osteoarthropathy causes mild neural foraminal narrowing. There is no striking narrowing of the left neural foramen. There is marked bilateral facet osteoarthropathy.   At L5-S1, there is no striking posterior disc contour abnormality. There is no spinal canal stenosis or neural foraminal narrowing. There is marked left facet  osteoarthropathy.       Multilevel degenerative changes of the lumbar spine without striking spinal canal stenosis at any level. There is narrowing of the right lateral recess at L4-L5.   This study was interpreted at [facility].   MACRO: None   Signed by: Blu Keating 9/7/2024 10:54 AM Dictation workstation:   [id number]    MR brain wo IV contrast    Result Date: 9/5/2024  Interpreted By:  Lm Thomason, STUDY: MR BRAIN WO IV CONTRAST;  9/5/2024 9:18 pm   INDICATION: Signs/Symptoms:stroke like symptoms.   COMPARISON: CT brain 09/04/2024.   ACCESSION NUMBER(S): [id number]   ORDERING CLINICIAN: PEGGY BHATT   TECHNIQUE: Multiplanar, multi-sequence images of the brain were obtained without contrast.   FINDINGS: The craniovertebral junction is normal.   Normal morphology of midline structures.   Diffusion weighted images show no evidence of acute ischemic infarct.   Biparietal encephalomalacia, left-greater-than-right. Moderate T2/FLAIR hyperintensities within the periventricular and subcortical white matter, nonspecific but likely reflecting sequela of small-vessel ischemic disease.   There is no evidence of an acute intraparenchymal hemorrhage, mass, mass-effect, midline shift or extra-axial fluid collection.   The ventricular size and cerebral volume are age-concordant.   The orbits and globes are unremarkable.   The paranasal sinuses show no hemorrhage or air-fluid levels.   The mastoid air cells are clear.       1. No diffusion restricting foci to suggest acute or subacute ischemia. 2. Biparietal chronic ischemic changes. 3. Nonspecific T2/FLAIR white matter hyperintensities likely reflecting sequela of small-vessel ischemic disease.       Signed by: Lm Thomason 9/5/2024 9:51 PM Dictation workstation:   [id number]    Transthoracic Echo (TTE) Complete    Result Date: 9/5/2024           [place] [address]            Phone  [phone] TRANSTHORACIC ECHOCARDIOGRAM REPORT Patient Name:      ITZ LINDSAY       Reading Physician:    [id number] Luis Alfonso DO Study Date:        9/5/2024              Ordering Provider:    [id number] HERMINIA TAYLOR MRN/PID:           [id number]              Fellow: Accession#:        [id number]          Nurse: Date of Birth/Age: 1950 / 74 years   Sonographer:          Iliana Oneil RDCS Gender:            M                     Additional Staff: Height:            182.88 cm             Admit Date: Weight:            70.76 kg              Admission Status:     Inpatient -                                                                Routine BSA / BMI:         1.92 m2 / 21.16 kg/m2 Department Location:  [facility] Blood Pressure: 156 /87 mmHg Study Type:    TRANSTHORACIC ECHO (TTE) COMPLETE Diagnosis/ICD: Elevated Troponin-R79.89; Dizziness and giddiness-R42;                Syncope-R55 Indication:    elevated troponin near syncope dizziness CPT Codes:     Echo Complete w Full [id number] Patient History: Smoker:            Former. Pertinent History: CVA and Syncope. dizziness near syncope fall weak elev                    troponin ho stroke. Study Detail: The following Echo studies were performed: 2D, M-Mode, Doppler and               color flow. Technically challenging study due to prominent lung               artifact.  PHYSICIAN INTERPRETATION: Left Ventricle: The left ventricular systolic function is normal, with a visually estimated ejection fraction of 55-60%. There are no regional wall motion abnormalities. The left ventricular cavity size is normal. Left ventricular diastolic filling was indeterminate. Left Atrium: The left atrium is normal in size. Right Ventricle: The right ventricle is normal in size.  There is normal right ventricular global systolic function. Right Atrium: The right atrium is normal in size. Aortic Valve: The aortic valve appears abnormal. The aortic valve appears bicuspid. The aortic valve dimensionless index is 0.92. There is no evidence of aortic valve regurgitation. The peak instantaneous gradient of the aortic valve is 7.4 mmHg. The mean gradient of the aortic valve is 4.0 mmHg. Mitral Valve: The mitral valve is normal in structure. There is no evidence of mitral valve regurgitation. Tricuspid Valve: The tricuspid valve is structurally normal. No evidence of tricuspid regurgitation. Pulmonic Valve: The pulmonic valve is not well visualized. The pulmonic valve regurgitation was not well visualized. Pericardium: There is no pericardial effusion noted. Aorta: The aortic root is normal.  CONCLUSIONS:  1. The left ventricular systolic function is normal, with a visually estimated ejection fraction of 55-60%.  2. Left ventricular diastolic filling was indeterminate.  3. There is normal right ventricular global systolic function.  4. The aortic valve appears bicuspid.  5. No aortic valve stenosis or regurgitation despite bicuspid appearance. QUANTITATIVE DATA SUMMARY: 2D MEASUREMENTS:                          Normal Ranges: IVSd:          0.69 cm   (0.6-1.1cm) LVPWd:         0.67 cm   (0.6-1.1cm) LVIDd:         4.26 cm   (3.9-5.9cm) LV Mass Index: 43.5 g/m2 LA VOLUME:                               Normal Ranges: LA Vol A4C:        73.8 ml    (22+/-6mL/m2) LA Vol A2C:        105.2 ml LA Vol BP:         88.1 ml LA Vol Index A4C:  38.5ml/m2 LA Vol Index A2C:  54.9 ml/m2 LA Vol Index BP:   46.0 ml/m2 LA Area A4C:       23.2 cm2 LA Area A2C:       27.7 cm2 LA Major Axis A4C: 6.2 cm LA Major Axis A2C: 6.2 cm LA Volume Index:   43.3 ml/m2 LA Vol A4C:        70.4 ml LA Vol A2C:        98.3 ml LA Vol Index BSA:  44.0 ml/m2 LV SYSTOLIC FUNCTION BY 2D PLANIMETRY (MOD):                      Normal Ranges:  EF-A4C View:    48 % (>=55%) EF-A2C View:    51 % EF-Biplane:     51 % EF-Visual:      58 % LV EF Reported: 58 % LV DIASTOLIC FUNCTION:                         Normal Ranges: MV Peak E:    0.98 m/s  (0.7-1.2 m/s) MV Peak A:    1.32 m/s  (0.42-0.7 m/s) E/A Ratio:    0.74      (1.0-2.2) MV e'         0.074 m/s (>8.0) MV lateral e' 0.10 m/s MV medial e'  0.05 m/s E/e' Ratio:   13.14     (<8.0) a'            0.10 m/s MITRAL VALVE:                 Normal Ranges: MV DT: 169 msec (150-240msec) AORTIC VALVE:                                   Normal Ranges: AoV Vmax:                1.36 m/s (<=1.7m/s) AoV Peak P.4 mmHg (<20mmHg) AoV Mean P.0 mmHg (1.7-11.5mmHg) LVOT Max John:            0.98 m/s (<=1.1m/s) AoV VTI:                 24.50 cm (18-25cm) LVOT VTI:                22.50 cm LVOT Diameter:           2.00 cm  (1.8-2.4cm) AoV Area, VTI:           2.89 cm2 (2.5-5.5cm2) AoV Area,Vmax:           2.26 cm2 (2.5-4.5cm2) AoV Dimensionless Index: 0.92  RIGHT VENTRICLE: RV Basal 2.69 cm RV Mid   2.09 cm RV Major 7.2 cm TAPSE:   32.0 mm RV s'    0.13 m/s TRICUSPID VALVE/RVSP:                             Normal Ranges: Peak TR Velocity: 2.36 m/s RV Syst Pressure: 30.3 mmHg (< 30mmHg) IVC Diam:         2.07 cm PULMONIC VALVE:                      Normal Ranges: PV Max John: 0.9 m/s  (0.6-0.9m/s) PV Max PG:  3.2 mmHg  [id number] [facility] YANES Electronically signed on 2024 at 9:49:48 AM  ** Final **     ECG 12 lead    Result Date: 2024  Normal sinus rhythm Nonspecific ST and T wave abnormality Abnormal ECG No previous ECGs available Confirmed by Jesse Linder ([id number]) on 2024 9:47:02 AM    CT angio head and neck w and wo IV contrast    Result Date: 2024  Interpreted By:  Taryn Cash and Ebai Jerky STUDY: CT ANGIO HEAD AND NECK W AND WO IV CONTRAST;  2024 3:48 pm   INDICATION: Signs/Symptoms:LLE weakness, prior CVA.     COMPARISON: None.   ACCESSION NUMBER(S): [id number]   ORDERING  CLINICIAN: ML FORBES   TECHNIQUE: After the administration of 75 mL Omnipaque 350, axial images of the head and neck were obtained. Coronal, sagittal, and 3-D reconstructions were provided for review.   FINDINGS: Please refer to dedicated CT of the head for additional findings which are reported separately.   CTA HEAD FINDINGS:   Anterior circulation: Atherosclerosis calcification of the right carotid siphon with mild luminal stenosis. There is a short segment of moderate stenosis involving right A3 segment anterior cerebral artery (series 8, image 100). There is a short segment of stenosis involving an inferior right M3 branch (series 8, image 138) there is distal reconstitution to normal caliber. There is also long segment luminal irregularity and mild stenosis of the inferior M2 branch of the left MCA (series 8, image 51-54).   Otherwise, the bilateral intracranial internal carotid arteries, bilateral carotid terminals, bilateral proximal anterior and middle cerebral arteries are patent. No aneurysms or dissection   Posterior circulation: Atherosclerosis calcification of the bilateral intradural vertebral arteries resulting in mild luminal stenosis of the right proximal intradural vertebral artery. There is mild luminal stenosis of the distal right intradural vertebral artery. Vertebrobasilar junction is unremarkable. Multifocal atherosclerosis calcification of the basilar artery without significant luminal stenosis. The bilateral proximal posterior cerebral arteries are patent. Bilateral intracranial vertebral arteries, vertebrobasilar junction, basilar artery and proximal posterior cerebral arteries are normal.   CTA NECK FINDINGS:   Aorta and great vessels: Typical 3 great vessel branching pattern. There is atherosclerosis calcification of the aortic arch. No significant stenosis of the origin of the great vessels.   Right carotid vessels: The common carotid artery is normal. Atherosclerosis  calcification of the carotid bifurcation and proximal cervical segment of the internal carotid artery without significant luminal stenosis by NASCET criteria. The remainder of the cervical internal carotid artery is patent. Brief retropharyngeal course of the cervical internal carotid artery.   Left carotid vessels: The common carotid artery is normal. Atherosclerosis calcification of the carotid bifurcation and proximal cervical segment of the internal carotid artery without significant luminal stenosis by NASCET criteria. Remainder cervical internal carotid artery is patent. Brief retropharyngeal course of the left cervical internal carotid artery   Vertebral vessels:  Prominent left cervical vertebral artery with a diminutive right cervical vertebral artery, a congenital variant. Multifocal atherosclerosis calcification of the bilateral cervical vertebral arteries most notable for mild luminal stenosis of the V2 segment of the right vertebral artery.   Other: Multiple bilateral hypodense thyroid lesions measuring up to 1.2 cm in the right. Patulous appearance of the upper esophagus. Multilevel degenerative changes cervical spine. Incompletely visualized mildly prominent mediastinal lymph nodes are nonspecific, however may be reactive in the setting of infection.       1. No large branch vessel cutoffs of the intracranial or cervical vessels. 2. There is a short segment of moderate stenosis involving the right A3 segment of the anterior cerebral artery, inferior M3 branch of the right middle cerebral artery, and long segment luminal irregularity and mild stenosis of the inferior M2 branch of the left MCA. Findings may represent sequela intracranial atherosclerotic disease. 3. There is mild luminal stenosis of the proximal and distal right intradural vertebral artery. 4. Multiple hypodense bilateral thyroid nodules measuring up to 1.2 cm. Recommend correlation with thyroid ultrasound if not previously obtained.   I  personally reviewed the images/study and I agree with the findings as stated by Resident Beverly Rios. This study was interpreted at [facility], [place], [place].   MACRO: None   Signed by: Taryn Cash 9/4/2024 4:24 PM Dictation workstation:   [id number]    CT cervical spine wo IV contrast    Result Date: 9/4/2024  Interpreted By:  Anjana Nicholas, STUDY: CT CERVICAL SPINE WO IV CONTRAST;  9/4/2024 2:29 pm   INDICATION: Signs/Symptoms:Fall.     COMPARISON: None.   ACCESSION NUMBER(S): [id number]   ORDERING CLINICIAN: FABBY WISE   TECHNIQUE: Axial CT images of the cervical spine are obtained. Axial, coronal and sagittal reconstructions are provided for review.   FINDINGS: There is mild anterior osteophytic spurring at C2-3 and C3-4. There is marked anterior osteophytic spurring at C4-5 through C6-7. There is mild posterior osteophytic spurring at C3-4 through C7-T1.   Fractures: There is no evidence for an acute fracture of the cervical spine.   Vertebral Alignment: Within normal limits.   Craniocervical Junction: The odontoid process and craniocervical junction are intact.   Vertebrae/Disc Spaces:  The cervical vertebral body heights are intact and the disc spaces are preserved.   Prevertebral/Paraspinal Soft Tissues: The prevertebral and paraspinal soft tissues are unremarkable.         No evidence for an acute fracture or subluxation of the cervical spine.   MACRO: None   Signed by: Anjana Nicholas 9/4/2024 2:47 PM Dictation workstation:   [id number]    XR chest 1 view    Result Date: 9/4/2024  Interpreted By:  Anjana Nicholas, STUDY: XR CHEST 1 VIEW;  9/4/2024 2:27 pm   INDICATION: Signs/Symptoms:Altered mental status.   COMPARISON: None.   ACCESSION NUMBER(S): [id number]   ORDERING CLINICIAN: FABBY WISE   FINDINGS: CARDIOMEDIASTINAL SILHOUETTE: Cardiomediastinal silhouette is normal in size and configuration.     LUNGS: Lungs are clear.   ABDOMEN: No remarkable upper  abdominal findings.     BONES: No acute osseous changes.       No acute cardiopulmonary process.   MACRO: None   Signed by: Anjana Nicholas 9/4/2024 2:45 PM Dictation workstation:   [id number]    CT brain attack head wo IV contrast    Result Date: 9/4/2024  Interpreted By:  Dalton Clancy, STUDY: CT BRAIN ATTACK HEAD WO IV CONTRAST;  9/4/2024 2:24 pm   INDICATION: Signs/Symptoms:Stroke Evaluation.   COMPARISON: None.   ACCESSION NUMBER(S): [id number]   ORDERING CLINICIAN: FABBY WISE   TECHNIQUE: Noncontrast axial CT scan of head was performed.   FINDINGS: Parenchyma: There is no intracranial hemorrhage. The grey-white differentiation is intact. There is no mass effect or midline shift. Left-greater-than-right biparietal encephalomalacia with calcification within the left focus, compatible with sequela of prior infarcts. Patchy supratentorial hypodensity, nonspecific, but likely secondary to moderate chronic microvascular ischemia.   CSF Spaces: Mild generalized volume loss with concordant ventriculomegaly.   Extra-Axial Fluid: There is no extraaxial fluid collection.   Calvarium: The calvarium is unremarkable.   Paranasal sinuses: Visualized paranasal sinuses are clear.   Mastoids: Clear.   Orbits: Bilateral lens replacements.   Soft tissues: Unremarkable.       No acute intracranial hemorrhage, mass effect, or CT apparent acute infarct. Chronic microvascular ischemia, sequela of prior bilateral infarcts and involutional changes.   Dalton Clancy discussed the significance and urgency of this critical finding by telephone with  FABBY WISE on 9/4/2024 at 2:37 pm.  (**-RCF-**) Findings:  See findings.       Signed by: Dalton Clancy 9/4/2024 2:38 PM Dictation workstation:   [id number]       Assessment/Plan   Assessment & Plan  Left leg weakness  CT with no acute intracranial abnormalities   MRI with no acute or subacute stroke.   Neurology consulted - appreciate recs   EEG ordered.   Lumbar spine MRI  with no acute stenosis noted    PT/OT recommending moderate intensity therapy at discharge     Frequent falls  PT/OT consulted   May be related to leg weakness   Neurology following   Plan for [facility] at discharge. Precert good through 9/13  Elevated troponin  HS troponin elevated with down-trend on admission   Cardiology consulted and signed off   No indication of ACS   Plan for outpatient follow up with possible stress testing   Disorientation  Resolved   May have been related to UTI in the setting of prior stroke     Primary hypertension  Continue home metoprolol dose   Added low dose amlodipine   Vital signs as ordered   Type 2 diabetes mellitus (Multi)  Glucose AC/HS with SSI coverage   Metformin held - plan to resume at discharge   Decrease daily Lantus to 15 units - glucose more stable   Hypoglycemia protocol   Acute cystitis without hematuria  Culture with e-coli   Continue cefazolin through tomorrow's 10 am dose       DVT prophylaxis   Plavix/SCDs     Minda Dominguez, APRN-CNP

## 2024-09-08 NOTE — ASSESSMENT & PLAN NOTE
Glucose AC/HS with SSI coverage   Metformin held - plan to resume at discharge   Decrease daily Lantus to 15 units - glucose more stable   Hypoglycemia protocol

## 2024-09-08 NOTE — CARE PLAN
The patient's goals for the shift include PT OT tests    The clinical goals for the shift include Maintain patient safety

## 2024-09-09 ENCOUNTER — APPOINTMENT (OUTPATIENT)
Dept: NEUROLOGY | Facility: HOSPITAL | Age: 74
DRG: 689 | End: 2024-09-09
Payer: MEDICARE

## 2024-09-09 LAB
ANION GAP SERPL CALC-SCNC: 9 MMOL/L
BUN SERPL-MCNC: 18 MG/DL (ref 8–25)
CALCIUM SERPL-MCNC: 8.9 MG/DL (ref 8.5–10.4)
CHLORIDE SERPL-SCNC: 105 MMOL/L (ref 97–107)
CO2 SERPL-SCNC: 26 MMOL/L (ref 24–31)
CREAT SERPL-MCNC: 0.7 MG/DL (ref 0.4–1.6)
EGFRCR SERPLBLD CKD-EPI 2021: >90 ML/MIN/1.73M*2
ERYTHROCYTE [DISTWIDTH] IN BLOOD BY AUTOMATED COUNT: 15 % (ref 11.5–14.5)
GLUCOSE BLD MANUAL STRIP-MCNC: 163 MG/DL (ref 74–99)
GLUCOSE BLD MANUAL STRIP-MCNC: 163 MG/DL (ref 74–99)
GLUCOSE BLD MANUAL STRIP-MCNC: 177 MG/DL (ref 74–99)
GLUCOSE BLD MANUAL STRIP-MCNC: 195 MG/DL (ref 74–99)
GLUCOSE SERPL-MCNC: 165 MG/DL (ref 65–99)
HCT VFR BLD AUTO: 31 % (ref 41–52)
HGB BLD-MCNC: 10.6 G/DL (ref 13.5–17.5)
MCH RBC QN AUTO: 30 PG (ref 26–34)
MCHC RBC AUTO-ENTMCNC: 34.2 G/DL (ref 32–36)
MCV RBC AUTO: 88 FL (ref 80–100)
NRBC BLD-RTO: 0 /100 WBCS (ref 0–0)
PLATELET # BLD AUTO: 202 X10*3/UL (ref 150–450)
POTASSIUM SERPL-SCNC: 3.5 MMOL/L (ref 3.4–5.1)
RBC # BLD AUTO: 3.53 X10*6/UL (ref 4.5–5.9)
SODIUM SERPL-SCNC: 140 MMOL/L (ref 133–145)
WBC # BLD AUTO: 5.1 X10*3/UL (ref 4.4–11.3)

## 2024-09-09 PROCEDURE — 97110 THERAPEUTIC EXERCISES: CPT | Mod: GO,CO

## 2024-09-09 PROCEDURE — 36415 COLL VENOUS BLD VENIPUNCTURE: CPT | Performed by: NURSE PRACTITIONER

## 2024-09-09 PROCEDURE — 1200000002 HC GENERAL ROOM WITH TELEMETRY DAILY

## 2024-09-09 PROCEDURE — 2500000004 HC RX 250 GENERAL PHARMACY W/ HCPCS (ALT 636 FOR OP/ED): Mod: JZ | Performed by: NURSE PRACTITIONER

## 2024-09-09 PROCEDURE — 85027 COMPLETE CBC AUTOMATED: CPT | Performed by: NURSE PRACTITIONER

## 2024-09-09 PROCEDURE — 2500000002 HC RX 250 W HCPCS SELF ADMINISTERED DRUGS (ALT 637 FOR MEDICARE OP, ALT 636 FOR OP/ED): Performed by: NURSE PRACTITIONER

## 2024-09-09 PROCEDURE — 97530 THERAPEUTIC ACTIVITIES: CPT | Mod: GO,CO

## 2024-09-09 PROCEDURE — 82947 ASSAY GLUCOSE BLOOD QUANT: CPT

## 2024-09-09 PROCEDURE — 2500000001 HC RX 250 WO HCPCS SELF ADMINISTERED DRUGS (ALT 637 FOR MEDICARE OP): Performed by: NURSE PRACTITIONER

## 2024-09-09 PROCEDURE — 95819 EEG AWAKE AND ASLEEP: CPT

## 2024-09-09 PROCEDURE — 95819 EEG AWAKE AND ASLEEP: CPT | Performed by: PSYCHIATRY & NEUROLOGY

## 2024-09-09 PROCEDURE — 80048 BASIC METABOLIC PNL TOTAL CA: CPT | Performed by: NURSE PRACTITIONER

## 2024-09-09 ASSESSMENT — COGNITIVE AND FUNCTIONAL STATUS - GENERAL
PERSONAL GROOMING: A LITTLE
DRESSING REGULAR LOWER BODY CLOTHING: A LITTLE
DRESSING REGULAR UPPER BODY CLOTHING: A LITTLE
WALKING IN HOSPITAL ROOM: A LITTLE
TOILETING: A LITTLE
DRESSING REGULAR LOWER BODY CLOTHING: A LITTLE
DAILY ACTIVITIY SCORE: 24
CLIMB 3 TO 5 STEPS WITH RAILING: A LITTLE
MOBILITY SCORE: 20
HELP NEEDED FOR BATHING: A LITTLE
DAILY ACTIVITIY SCORE: 20
STANDING UP FROM CHAIR USING ARMS: A LITTLE
MOVING TO AND FROM BED TO CHAIR: A LITTLE
TOILETING: A LITTLE
DRESSING REGULAR UPPER BODY CLOTHING: A LITTLE
WALKING IN HOSPITAL ROOM: A LOT
STANDING UP FROM CHAIR USING ARMS: A LITTLE
DAILY ACTIVITIY SCORE: 19
CLIMB 3 TO 5 STEPS WITH RAILING: TOTAL
HELP NEEDED FOR BATHING: A LITTLE

## 2024-09-09 ASSESSMENT — PAIN SCALES - GENERAL
PAINLEVEL_OUTOF10: 0 - NO PAIN

## 2024-09-09 ASSESSMENT — PAIN - FUNCTIONAL ASSESSMENT
PAIN_FUNCTIONAL_ASSESSMENT: 0-10
PAIN_FUNCTIONAL_ASSESSMENT: 0-10

## 2024-09-09 NOTE — PROGRESS NOTES
Nutrition Follow up Note    Nutrition Assessment      MRI with no acute findings. EEG scheduled for today. Anticipating discharge to SNF, precert already approved. PO intake good at this time.    Nutrition History:     Energy Intake: Good > 75 %  Food Allergies/Intolerances:  None  GI Symptoms: None  Oral Problems: None    Anthropometrics:  Ht: 182.9 cm (6'), Wt: 90.2 kg (198 lb 13.7 oz), BMI: 26.96  IBW/kg (Dietitian Calculated): 80.9 kg  Percent of IBW: 88 %     Weight Change:  Daily Weight  09/09/24 : 90.2 kg (198 lb 13.7 oz)     Nutrition Focused Physical Exam Findings:   Subcutaneous Fat Loss  Orbital Fat Pads: Mild-Moderate (slight dark circles and slight hollowing)  Buccal Fat Pads: Mild-Moderate (flat cheeks, minimal bounce)  Triceps: Mild-Moderate (less than ample fat tissue)    Muscle Wasting  Temporalis: Mild-Moderate (slight depression)  Pectoralis (Clavicular Region): Mild-Moderate (some protrusion of clavicle)  Deltoid/Trapezius: Mild-Moderate (slight protrusion of acromion process)  Interosseous: Well nourished (muscle bulges)  Gastrocnemius: Well nourished (well developed bulbous muscle)    Nutrition Significant Labs:  Lab Results   Component Value Date    WBC 5.1 09/09/2024    HGB 10.6 (L) 09/09/2024    HCT 31.0 (L) 09/09/2024     09/09/2024    ALT 8 09/04/2024    AST 20 09/04/2024     09/09/2024    K 3.5 09/09/2024     09/09/2024    CREATININE 0.70 09/09/2024    BUN 18 09/09/2024    CO2 26 09/09/2024    TSH 2.96 09/04/2024    INR 1.1 09/05/2024    HGBA1C 6.3 (H) 09/04/2024     Nutrition Specific Medications:  amLODIPine, 2.5 mg, oral, Daily  atorvastatin, 40 mg, oral, Daily  ceFAZolin, 1 g, intravenous, q8h  clopidogrel, 75 mg, oral, Daily  finasteride, 5 mg, oral, Daily  insulin glargine, 15 Units, subcutaneous, Nightly  insulin lispro, 0-5 Units, subcutaneous, TID  metoprolol tartrate, 25 mg, oral, BID  pantoprazole, 40 mg, oral, Daily  perflutren lipid microspheres, 0.5-10 mL  of dilution, intravenous, Once in imaging         Dietary Orders (From admission, onward)       Start     Ordered    09/05/24 1406  Oral nutritional supplements  Until discontinued        Comments: Any flavor   Question Answer Comment   Deliver with All meals    Select supplement: Pro-Stat Sugar Free        09/05/24 1406    09/04/24 1709  Adult diet Regular, Consistent Carb, Cardiac; CCD 60 gm/meal; 70 gm fat; 2 - 3 grams Sodium  Diet effective now        Question Answer Comment   Diet type Regular    Diet type Consistent Carb    Diet type Cardiac    Carb diet selection: CCD 60 gm/meal    Fat restriction: 70 gm fat    Sodium restriction: 2 - 3 grams Sodium        09/04/24 1709                   Estimated Needs:   Estimated Energy Needs  Total Energy Estimated Needs (kCal): 2130 kCal  Total Estimated Energy Need per Day (kCal/kg): 30 kCal/kg  Method for Estimating Needs: Actual Wt    Estimated Protein Needs  Total Protein Estimated Needs (g):  ()  Total Protein Estimated Needs (g/kg):  (1.2-1.5)  Method for Estimating Needs: Actual Wt    Estimated Fluid Needs  Total Fluid Estimated Needs (mL): 2130 mL  Method for Estimating Needs: 1 mL/kcal      Nutrition Diagnosis   Nutrition Diagnosis:  Malnutrition Diagnosis  Patient has Malnutrition Diagnosis: Yes  Diagnosis Status: Ongoing  Malnutrition Diagnosis: Severe malnutrition related to acute disease or injury  As Evidenced by: 16.1% weight loss in one month, <75% po intake for 1 month, moderate subcutaneous fat and muscle losses        Nutrition Interventions/Recommendations   Nutrition Interventions and Recommendations:    Nutrition Prescription:  Individualized Nutrition Prescription Provided for : 2130 calories,  gm protein to be provided via diet/nutritional supplements    Nutrition Interventions:   Food and/or Nutrient Delivery Interventions  Interventions: Meals and snacks, Medical food supplement  Meals and Snacks: Carbohydrate-modified diet,  Fat-modified diet, Mineral-modified diet  Goal: provide as ordered  Medical Food Supplement: Modified beverage  Goal: mighty shake TID to provide 200 kcals and 7g protein each    Education Documentation  No documentation found.         Nutrition Monitoring and Evaluation   Monitoring/Evaluation:   Food/Nutrient Related History Monitoring  Monitoring and Evaluation Plan: Energy intake  Energy Intake: Estimated energy intake  Criteria: pt to consume >/= 75% estimated needs    Body Composition/Growth/Weight History  Monitoring and Evaluation Plan: Weight  Weight: Measured weight  Criteria: pt will maintain/gain wt       Time Spent/Follow-up:   Follow Up  Time Spent (min): 20 minutes  Last Date of Nutrition Visit: 09/09/24  Nutrition Follow-Up Needed?: 5-7 days  Follow up Comment: 9/13/24

## 2024-09-09 NOTE — PROGRESS NOTES
Occupational Therapy    OT Treatment    Patient Name: Gabriel Skinner  MRN: [id number]  Today's Date: 9/9/2024  Time Calculation  Start Time: 1435  Stop Time: 1458  Time Calculation (min): 23 min        Assessment:  OT Assessment: Tolerated session well, demonstrating continued progression towards POC with intermittent rest breaks required throughout d/t fatigue + cues required for safety awareness. Pt would benefit from continued skilled OT services to improve strength, balance, and functional tolerance to increase independence with ADL tasks  End of Session Communication: Bedside nurse  End of Session Patient Position: Up in chair, Alarm off, not on at start of session  OT Assessment Results: Decreased ADL status, Decreased cognition, Decreased endurance, Visual deficit, Decreased functional mobility, Decreased IADLs  Plan:  Treatment Interventions: ADL retraining, Visual perceptual retraining, Functional transfer training, UE strengthening/ROM, Endurance training, Cognitive reorientation, Patient/family training, Equipment evaluation/education, Neuromuscular reeducation, Compensatory technique education  OT Frequency: 4 times per week  OT Discharge Recommendations: Moderate intensity level of continued care  Equipment Recommended upon Discharge: Wheeled walker  OT Recommended Transfer Status: Minimal assist  OT - OK to Discharge: Yes  Treatment Interventions: ADL retraining, Visual perceptual retraining, Functional transfer training, UE strengthening/ROM, Endurance training, Cognitive reorientation, Patient/family training, Equipment evaluation/education, Neuromuscular reeducation, Compensatory technique education    Subjective   Previous Visit Info:  OT Last Visit  OT Received On: 09/09/24  General:  General  Prior to Session Communication: Bedside nurse  Patient Position Received: Up in chair, Alarm off, not on at start of session  General Comment: Cleared for therapy per RN. Pt seated in chair upon arrival and  agreeable to tx  Precautions:  Hearing/Visual Limitations: wears glasses with decreased Right peripheral vision;  mildly [place]  Medical Precautions: Fall precautions    Pain:  Pain Assessment  Pain Assessment: 0-10  0-10 (Numeric) Pain Score: 0 - No pain    Objective    Cognition:  Cognition  Following Commands: Follows one step commands with increased time  Cognition Comments: impaired safety awareness  Insight: Mild  Processing Speed: Delayed    Activities of Daily Living:    Grooming  Grooming Comments: pt requesting to complete grooming tasks at later time    LE Dressing  LE Dressing: Yes  Shoe Level of Assistance: Setup, Close supervision  LE Dressing Where Assessed: Chair, Edge of bed  LE Dressing Comments: pt able to don/doff shoes x2 utilizing figure four dressing technique with increased time + effort required for task completion    Bed Mobility/Transfers:    Transfers  Transfer: Yes  Transfer 1  Technique 1: Sit to stand, Stand to sit  Transfer Device 1: Cane  Transfer Level of Assistance 1: Close supervision  Trials/Comments 1: sit>stand x2 with cues for proper hand placement, demonstrating decreased eccentric lowering to surfaces    Toilet Transfers  Toilet Transfer Type: To and from  Toilet Transfer to: Standard toilet  Toilet Transfers: Contact guard  Toilet Transfers Comments: assist for balance with cues for use of grab bars for UE support    Functional Mobility:  Functional Mobility  Functional Mobility Performed: Yes  Functional Mobility 1  Device 1: Single point cane  Assistance 1: Contact guard  Comments 1: functional mobility extended household distance with CGA for safety, requiring rest break x1 d/t dizziness with cues for pacing and use of pursed lip breathing in effort to improve funcitonal tolerance, demonstrating fairbalance throughout task    Standing Balance:  Dynamic Standing Balance  Dynamic Standing-Level of Assistance: Close supervision, Contact guard  Dynamic Standing-Balance: Forward  lean, Reaching for objects, Kicking ball  Dynamic Standing-Comments: fair balance during functional balance task with cues for pacing task    Therapy/Activity: Therapeutic Exercise  Therapeutic Exercise Performed: Yes  Therapeutic Exercise Activity 1: participated in AROM BUE exercises 4x10 in all planes to improve strength and functional tolerance to increase independence with transfer tasks with cues provided for proper muscle recruitement  Therapeutic Exercise Activity 2: sit>stand x6 from EOB with cues for proper hand placement and eccentric lowering, working on improving functional balance and BUE strength in effort to improve independence with transfer tasks    Therapeutic Activity  Therapeutic Activity Performed: Yes  Therapeutic Activity 1: participated in item retrieval task in cabinets, reaching for ADL items for sponge bath, retrieving items from various levels working on functional balance in effort to improve independence with ADL/IADL tasks for homegoing    Outcome Measures:[facility] Daily Activity  Putting on and taking off regular lower body clothing: A little  Bathing (including washing, rinsing, drying): A little  Putting on and taking off regular upper body clothing: A little  Toileting, which includes using toilet, bedpan or urinal: A little  Taking care of personal grooming such as brushing teeth: A little  Eating Meals: None  Daily Activity - Total Score: 19    Education Documentation  Home Exercise Program, taught by CHARLA Doherty at 9/9/2024  3:12 PM.  Learner: Patient  Readiness: Acceptance  Method: Explanation  Response: Verbalizes Understanding    ADL Training, taught by CHARLA Doherty at 9/9/2024  3:12 PM.  Learner: Patient  Readiness: Acceptance  Method: Explanation  Response: Verbalizes Understanding    Education Comments  No comments found.    Problem: OT Goals  Goal: Pt will complete all functional transfers and mobility with mod indep using a RW or  cane.  Outcome: Progressing  Goal: Pt will tolerate functional mobility for a household distance using a RW or cane with mod indep.  Outcome: Progressing  Goal: Pt will complete all ADL's with mod indep/indep using adaptive equipment as needed.  Outcome: Progressing

## 2024-09-09 NOTE — PROGRESS NOTES
Gabriel Skinner is a 74 y.o. male on day 5 of admission presenting with Left leg weakness.    Subjective   Patient sitting in chair at  bedside. Denies chest pain, shortness of breath, abdominal pain.         Objective     Physical Exam  Constitutional:       Appearance: Normal appearance.   HENT:      Head: Normocephalic and atraumatic.      Mouth/Throat:      Mouth: Mucous membranes are moist.      Pharynx: Oropharynx is clear.   Eyes:      Extraocular Movements: Extraocular movements intact.      Conjunctiva/sclera: Conjunctivae normal.      Pupils: Pupils are equal, round, and reactive to light.   Cardiovascular:      Rate and Rhythm: Normal rate and regular rhythm.      Pulses: Normal pulses.      Heart sounds: Normal heart sounds.   Pulmonary:      Effort: Pulmonary effort is normal.      Breath sounds: Normal breath sounds.   Abdominal:      General: Bowel sounds are normal.      Palpations: Abdomen is soft.      Tenderness: There is no abdominal tenderness.   Musculoskeletal:         General: Normal range of motion.      Cervical back: Normal range of motion and neck supple.   Skin:     General: Skin is warm and dry.      Capillary Refill: Capillary refill takes less than 2 seconds.   Neurological:      General: No focal deficit present.      Mental Status: He is alert and oriented to person, place, and time.   Psychiatric:         Mood and Affect: Mood normal.         Behavior: Behavior normal.         Last Recorded Vitals  Blood pressure 143/86, pulse 77, temperature 36.9 °C (98.4 °F), temperature source Oral, resp. rate 16, height 1.829 m (6'), weight 90.2 kg (198 lb 13.7 oz), SpO2 100%.  Intake/Output last 3 Shifts:  I/O last 3 completed shifts:  In: 2400 (26.6 mL/kg) [P.O.:2300; IV Piggyback:100]  Out: - (0 mL/kg)   Weight: 90.2 kg     Relevant Results  Results for orders placed or performed during the hospital encounter of 09/04/24 (from the past 24 hour(s))   POCT GLUCOSE   Result Value Ref Range     POCT Glucose 156 (H) 74 - 99 mg/dL   Basic Metabolic Panel   Result Value Ref Range    Glucose 165 (H) 65 - 99 mg/dL    Sodium 140 133 - 145 mmol/L    Potassium 3.5 3.4 - 5.1 mmol/L    Chloride 105 97 - 107 mmol/L    Bicarbonate 26 24 - 31 mmol/L    Urea Nitrogen 18 8 - 25 mg/dL    Creatinine 0.70 0.40 - 1.60 mg/dL    eGFR >90 >60 mL/min/1.73m*2    Calcium 8.9 8.5 - 10.4 mg/dL    Anion Gap 9 <=19 mmol/L   CBC   Result Value Ref Range    WBC 5.1 4.4 - 11.3 x10*3/uL    nRBC 0.0 0.0 - 0.0 /100 WBCs    RBC 3.53 (L) 4.50 - 5.90 x10*6/uL    Hemoglobin 10.6 (L) 13.5 - 17.5 g/dL    Hematocrit 31.0 (L) 41.0 - 52.0 %    MCV 88 80 - 100 fL    MCH 30.0 26.0 - 34.0 pg    MCHC 34.2 32.0 - 36.0 g/dL    RDW 15.0 (H) 11.5 - 14.5 %    Platelets 202 150 - 450 x10*3/uL   POCT GLUCOSE   Result Value Ref Range    POCT Glucose 163 (H) 74 - 99 mg/dL   POCT GLUCOSE   Result Value Ref Range    POCT Glucose 177 (H) 74 - 99 mg/dL     EEG    Result Date: 9/9/2024  IMPRESSION This routine EEG is normal in awake and sleep state. No epileptiform discharges or lateralizing signs are seen. A full report will be scanned into the patient's chart at a later time. This report has been interpreted and electronically signed by       Assessment/Plan   Assessment & Plan  Left leg weakness  CT with no acute intracranial abnormalities   MRI with no acute or subacute stroke.   Neurology consulted - appreciate recs   EEG ordered - preliminary report  states  results  are  normal   Lumbar spine MRI with no acute stenosis noted    PT/OT recommending moderate intensity therapy at discharge   Follow up with Dr. Wills as outpatient   Frequent falls  PT/OT consulted   May be related to leg weakness   Neurology following   Plan for [facility] at discharge. Precert good through 9/13  Elevated troponin  HS troponin elevated with down-trend on admission   Cardiology consulted and signed off   No indication of ACS   Plan for outpatient follow up with possible  stress testing   Disorientation  Resolved   May have been related to UTI in the setting of prior stroke     Primary hypertension  Continue home metoprolol dose   Added low dose amlodipine   Vital signs as ordered   Type 2 diabetes mellitus (Multi)  Glucose AC/HS with SSI coverage   Metformin held - plan to resume at discharge   Decrease daily Lantus to 15 units - glucose more stable   Hypoglycemia protocol   Acute cystitis without hematuria  Culture with e-coli   Continue cefazolin through tomorrow's 10 am dose     DVT  prophylaxis   SCDs/Plavix     Minda Dominguez, APRN-CNP

## 2024-09-09 NOTE — NURSING NOTE
Bed side shift report received. Patient resting comfortably, sitting up at bedside. Reports no current concerns at this time. Call light within reach. This nurse assumed care.

## 2024-09-09 NOTE — NURSING NOTE
End of shift, bedside shift report given. Patient sitting up at bed side resting comfortably, with call light within reach. Patient verbalizes no new concerns at this time.

## 2024-09-09 NOTE — ASSESSMENT & PLAN NOTE
PT/OT consulted   May be related to leg weakness   Neurology following   Plan for [facility] at discharge. Precert good through 9/13

## 2024-09-09 NOTE — ASSESSMENT & PLAN NOTE
CT with no acute intracranial abnormalities   MRI with no acute or subacute stroke.   Neurology consulted - appreciate recs   EEG ordered - preliminary report  states  results  are  normal   Lumbar spine MRI with no acute stenosis noted    PT/OT recommending moderate intensity therapy at discharge   Follow up with Dr. Wills as outpatient

## 2024-09-09 NOTE — DOCUMENTATION CLARIFICATION NOTE
"    PATIENT:               ITZ LINDSAY  ACCT #:                  [id number]  MRN:                       [id number]  :                       1950  ADMIT DATE:       2024 1:43 PM  DISCH DATE:  RESPONDING PROVIDER #:        [id number]          PROVIDER RESPONSE TEXT:    Metabolic encephalopathy 2/2 Cystitis    CDI QUERY TEXT:    Clarification        Instruction:    Based on your assessment of the patient and the clinical information, please provide the requested documentation by clicking on the appropriate radio button and enter any additional information if prompted.    Question: Please further clarify the most likely etiology of the altered mental status as    When answering this query, please exercise your independent professional judgment. The fact that a question is being asked, does not imply that any particular answer is desired or expected.    The patient's clinical indicators include:  Clinical Information:  Altered Mental Status; Itz Lindsay is a 74 y.o. male presenting with dizziness and fall.     Teleconsult:  \"Was found to be confused and with left leg weakness.\"     H/P (CARLIE Perez MD):  UTI.  Urinary Tract infection: Started on IV Ceftriaxone. Wait for Urine culture.  DM type 2: concern for episodes of hypoglycemia.     Medicine PN(CARLIE Perez MD):  Altered mental status:  etiology still under investigation. Only findings for now is UTI. MRI of the brain is pending. He is already on Plavix and lipitor. Appreciate neuro recommendations.  Left leg weakness in patient with hx of CVA:  work up for stroke in progress. Neuro following.  Fall:  related to #1 and 2.    Clinical Indicators:  UA:  75 LE; 1+bacteria; 11-20 Leukocytes  Urine Culture:   GRAM-NEGATIVE BACILLUS  B(pre-hospital);  WBC:  5.6/6.7  Troponin:  103/88/85   MR Brain:  \"IMPRESSION:  1.No diffusion restricting foci to suggest acute or subacute  ischemia.  2.Biparietal chronic ischemic changes.  3.Nonspecific " "T2/FLAIR white matter hyperintensities likely  reflecting sequela of small-vessel ischemic disease.\"    Treatment:  Stroke JENKINS; Rocephin IV(9/4-)PT/OT; Home Plavix/Lipitor    Risk Factors:  Previous stroke; Age; HLD; HTN  Options provided:  -- Metabolic encephalopathy 2/2 Cystitis  -- Other - I will add my own diagnosis  -- Refer to Clinical Documentation Reviewer    Query created by: Jewels Tesfaye on 9/6/2024 8:47 AM      Electronically signed by:  NEETA CHILDERS-CNP 9/9/2024 11:45 AM          "

## 2024-09-10 VITALS
HEART RATE: 90 BPM | RESPIRATION RATE: 17 BRPM | BODY MASS INDEX: 26.13 KG/M2 | OXYGEN SATURATION: 100 % | HEIGHT: 72 IN | DIASTOLIC BLOOD PRESSURE: 82 MMHG | WEIGHT: 192.9 LBS | TEMPERATURE: 98.4 F | SYSTOLIC BLOOD PRESSURE: 150 MMHG

## 2024-09-10 PROBLEM — R79.89 ELEVATED TROPONIN: Status: RESOLVED | Noted: 2024-09-04 | Resolved: 2024-09-10

## 2024-09-10 PROBLEM — R29.6 FREQUENT FALLS: Status: RESOLVED | Noted: 2024-09-04 | Resolved: 2024-09-10

## 2024-09-10 PROBLEM — R41.0 DISORIENTATION: Status: RESOLVED | Noted: 2024-09-06 | Resolved: 2024-09-10

## 2024-09-10 PROBLEM — N30.00 ACUTE CYSTITIS WITHOUT HEMATURIA: Status: RESOLVED | Noted: 2024-09-06 | Resolved: 2024-09-10

## 2024-09-10 PROBLEM — I10 PRIMARY HYPERTENSION: Status: RESOLVED | Noted: 2024-09-06 | Resolved: 2024-09-10

## 2024-09-10 LAB
25(OH)D3 SERPL-MCNC: 20 NG/ML (ref 31–100)
ANION GAP SERPL CALC-SCNC: 12 MMOL/L
BUN SERPL-MCNC: 21 MG/DL (ref 8–25)
CALCIUM SERPL-MCNC: 9.4 MG/DL (ref 8.5–10.4)
CHLORIDE SERPL-SCNC: 106 MMOL/L (ref 97–107)
CO2 SERPL-SCNC: 24 MMOL/L (ref 24–31)
CREAT SERPL-MCNC: 0.7 MG/DL (ref 0.4–1.6)
EGFRCR SERPLBLD CKD-EPI 2021: >90 ML/MIN/1.73M*2
ERYTHROCYTE [DISTWIDTH] IN BLOOD BY AUTOMATED COUNT: 15.3 % (ref 11.5–14.5)
GLUCOSE BLD MANUAL STRIP-MCNC: 166 MG/DL (ref 74–99)
GLUCOSE BLD MANUAL STRIP-MCNC: 244 MG/DL (ref 74–99)
GLUCOSE SERPL-MCNC: 136 MG/DL (ref 65–99)
HCT VFR BLD AUTO: 33 % (ref 41–52)
HGB BLD-MCNC: 10.8 G/DL (ref 13.5–17.5)
MCH RBC QN AUTO: 29.9 PG (ref 26–34)
MCHC RBC AUTO-ENTMCNC: 32.7 G/DL (ref 32–36)
MCV RBC AUTO: 91 FL (ref 80–100)
NRBC BLD-RTO: 0 /100 WBCS (ref 0–0)
PLATELET # BLD AUTO: 203 X10*3/UL (ref 150–450)
POTASSIUM SERPL-SCNC: 3.9 MMOL/L (ref 3.4–5.1)
RBC # BLD AUTO: 3.61 X10*6/UL (ref 4.5–5.9)
SODIUM SERPL-SCNC: 142 MMOL/L (ref 133–145)
VIT B12 SERPL-MCNC: 526 PG/ML (ref 211–946)
WBC # BLD AUTO: 5.2 X10*3/UL (ref 4.4–11.3)

## 2024-09-10 PROCEDURE — 80048 BASIC METABOLIC PNL TOTAL CA: CPT | Performed by: NURSE PRACTITIONER

## 2024-09-10 PROCEDURE — 2500000001 HC RX 250 WO HCPCS SELF ADMINISTERED DRUGS (ALT 637 FOR MEDICARE OP): Performed by: NURSE PRACTITIONER

## 2024-09-10 PROCEDURE — 97530 THERAPEUTIC ACTIVITIES: CPT | Mod: GO,CO

## 2024-09-10 PROCEDURE — 36415 COLL VENOUS BLD VENIPUNCTURE: CPT | Performed by: PSYCHIATRY & NEUROLOGY

## 2024-09-10 PROCEDURE — 85027 COMPLETE CBC AUTOMATED: CPT | Performed by: NURSE PRACTITIONER

## 2024-09-10 PROCEDURE — 2500000002 HC RX 250 W HCPCS SELF ADMINISTERED DRUGS (ALT 637 FOR MEDICARE OP, ALT 636 FOR OP/ED): Performed by: NURSE PRACTITIONER

## 2024-09-10 PROCEDURE — 82947 ASSAY GLUCOSE BLOOD QUANT: CPT

## 2024-09-10 PROCEDURE — 97110 THERAPEUTIC EXERCISES: CPT | Mod: GO,CO

## 2024-09-10 PROCEDURE — 82306 VITAMIN D 25 HYDROXY: CPT | Performed by: PSYCHIATRY & NEUROLOGY

## 2024-09-10 PROCEDURE — 36415 COLL VENOUS BLD VENIPUNCTURE: CPT | Performed by: NURSE PRACTITIONER

## 2024-09-10 PROCEDURE — 82607 VITAMIN B-12: CPT | Performed by: PSYCHIATRY & NEUROLOGY

## 2024-09-10 RX ORDER — CALCIUM CARBONATE 200(500)MG
500 TABLET,CHEWABLE ORAL 4 TIMES DAILY PRN
Start: 2024-09-10

## 2024-09-10 RX ORDER — INSULIN GLARGINE 100 [IU]/ML
15 INJECTION, SOLUTION SUBCUTANEOUS NIGHTLY
Start: 2024-09-10

## 2024-09-10 RX ORDER — AMLODIPINE BESYLATE 2.5 MG/1
2.5 TABLET ORAL DAILY
Start: 2024-09-11

## 2024-09-10 RX ORDER — INSULIN LISPRO 100 [IU]/ML
0-5 INJECTION, SOLUTION INTRAVENOUS; SUBCUTANEOUS
Start: 2024-09-10

## 2024-09-10 RX ORDER — ACETAMINOPHEN 325 MG/1
650 TABLET ORAL EVERY 4 HOURS PRN
Start: 2024-09-10

## 2024-09-10 ASSESSMENT — COGNITIVE AND FUNCTIONAL STATUS - GENERAL
DRESSING REGULAR UPPER BODY CLOTHING: A LITTLE
TOILETING: A LOT
HELP NEEDED FOR BATHING: A LITTLE
DRESSING REGULAR LOWER BODY CLOTHING: A LOT
DAILY ACTIVITIY SCORE: 18

## 2024-09-10 ASSESSMENT — PAIN SCALES - GENERAL
PAINLEVEL_OUTOF10: 0 - NO PAIN

## 2024-09-10 ASSESSMENT — PAIN - FUNCTIONAL ASSESSMENT
PAIN_FUNCTIONAL_ASSESSMENT: 0-10
PAIN_FUNCTIONAL_ASSESSMENT: FLACC (FACE, LEGS, ACTIVITY, CRY, CONSOLABILITY)
PAIN_FUNCTIONAL_ASSESSMENT: FLACC (FACE, LEGS, ACTIVITY, CRY, CONSOLABILITY)

## 2024-09-10 NOTE — NURSING NOTE
Called report to Christine to receiving RN. Informed of new medications as well as follow up care and informed a copy of all this information would come with the patient. Went in to update patient and he is agreeable to DC, assisted with packing up his belongings and IV was removed. While in room Oriana arrived to take patient. They were given report as well as the DC papers. Pt. Assisted to wheelchair and belongings given to Oriana personal.

## 2024-09-10 NOTE — NURSING NOTE
Rounded on pt. Pt laying in bed, eyes closed, respirations even and unlabored. Pt appears to be sleeping and in no apparent pain or distress. Call bell and belongings are placed in reach. Telemetry leads connected and reading on monitor. Bed alarm refused. Continuing to monitor.

## 2024-09-10 NOTE — DOCUMENTATION CLARIFICATION NOTE
"    PATIENT:               ITZ LINDSAY  ACCT #:                  [id number]  MRN:                       [id number]  :                       1950  ADMIT DATE:       2024 1:43 PM  DISCH DATE:  RESPONDING PROVIDER #:        [id number]          PROVIDER RESPONSE TEXT:    Fall/weakness related to UTI in the setting of old CVA/left -sided weakness    CDI QUERY TEXT:    Clarification        Instruction:    Based on your assessment of the patient and the clinical information, please provide the requested documentation by clicking on the appropriate radio button and enter any additional information if prompted.    Question: Please clarify if a relationship exists between    When answering this query, please exercise your independent professional judgment. The fact that a question is being asked, does not imply that any particular answer is desired or expected.    The patient's clinical indicators include:  Clinical Information:  Altered Mental Status; Itz Lindsay is a 74 y.o. male presenting with dizziness and fall.     Teleconsult:  \"Was found to be confused and with left leg weakness.\"     H/P (CARLIE Perez MD):  UTI.  Urinary Tract infection: Started on IV Ceftriaxone. Wait for Urine culture.  DM type 2: concern for episodes of hypoglycemia.     Medicine PN(CARLIE Perez MD):  1,Altered mental status:  etiology still under investigation. Only findings for now is UTI. MRI of the brain is pending. He is already on Plavix and lipitor. Appreciate neuro recommendations.  2 Left leg weakness in patient with hx of CVA:  work up for stroke in progress. Neuro following.  Fall:  related to #1 and 2.     PT:  \"Past Medical History Relevant to Rehab: DM, CVA with Left sided weakness; HTN, falls\"      OT PN:  \"Movements are Fluid and Coordinated: No  Upper Body Coordination: slower rate and accuracy of movments L>R  Lower Body Coordination: slower rate of movements L>R\"     Hospitalist PN (ANTIONETTE Dominguez CNP):  " "\"Left leg weakness  CT with no acute intracranial abnormalities  MRI with no acute or subacute stroke.  Neurology consulted - appreciate recs  EEG ordered - preliminary report  states  results  are  normal  Lumbar spine MRI with no acute stenosis noted  PT/OT recommending moderate intensity therapy at discharge  Follow up with Dr. Wills as outpatient\"     Clarification documentation (ANTIONETTE Dominguez CNP):  \"Metabolic encephalopathy 2/2 Cystitis\"    Clinical Indicators:  UA:  75 LE; 1+bacteria; 11-20 Leukocytes  Urine Culture:   GRAM-NEGATIVE BACILLUS  B(pre-hospital);  WBC:  5.6/6.7  Troponin:  103/88/85   MR Brain:  \"IMPRESSION:  1.No diffusion restricting foci to suggest acute or subacute  ischemia.  2.Biparietal chronic ischemic changes.  3.Nonspecific T2/FLAIR white matter hyperintensities likely  reflecting sequela of small-vessel ischemic disease.\"    Treatment:  Stroke JENKINS; Rocephin IV(-)PT/OT; Home Plavix/Lipitor    Risk Factors:  Previous stroke; Age; HLD; HTN  Options provided:  -- Fall/weakness related to UTI in the setting of old CVA/left -sided weakness  -- Fall/weakness related to UTI and deconditioning in the setting of old CVA/left-sided weakness  -- Other - I will add my own diagnosis  -- Refer to Clinical Documentation Reviewer    Query created by: Jewels Tesfaye on 9/10/2024 8:27 AM      Electronically signed by:  NEETA CHILDERS-CNP 9/10/2024 9:18 AM          "

## 2024-09-10 NOTE — DISCHARGE SUMMARY
Discharge Diagnosis  Left leg weakness    Issues Requiring Follow-Up  Follow up with neurology as instructed     Discharge Meds     Medication List      START taking these medications     acetaminophen 325 mg tablet; Commonly known as: Tylenol; Take 2 tablets   (650 mg) by mouth every 4 hours if needed for mild pain (1 - 3) or fever   (temp greater than 38.0 C).   amLODIPine 2.5 mg tablet; Commonly known as: Norvasc; Take 1 tablet (2.5   mg) by mouth once daily.; Start taking on: September 11, 2024   calcium carbonate 200 mg calcium chewable tablet; Commonly known as:   Tums; Chew 1 tablet (500 mg) 4 times a day as needed for indigestion or   heartburn.   glucagon 1 mg injection; Commonly known as: Glucagen; Inject 1 mg into   the muscle every 15 minutes if needed for low blood sugar - see comments   (For blood glucose less than or equal to 70 mg/dL and no IV access).   insulin lispro 100 unit/mL injection; Commonly known as: HumaLOG; Inject   0-5 Units under the skin 3 times daily (morning, midday, late afternoon).   Take as directed per insulin instructions.     CHANGE how you take these medications     Lantus U-100 Insulin 100 unit/mL injection; Generic drug: insulin   glargine; Inject 15 Units under the skin once daily at bedtime. Take as   directed per insulin instructions.; What changed: how much to take     CONTINUE taking these medications     atorvastatin 40 mg tablet; Commonly known as: Lipitor   clopidogrel 75 mg tablet; Commonly known as: Plavix   finasteride 5 mg tablet; Commonly known as: Proscar   metFORMIN 500 mg tablet; Commonly known as: Glucophage   metoprolol tartrate 25 mg tablet; Commonly known as: Lopressor       Test Results Pending At Discharge  Pending Labs       Order Current Status    Vitamin B12 In process    Vitamin D 25-Hydroxy,Total (for eval of Vitamin D levels) In process            Hospital Course   Patient presented to ER 9/4 with dizziness and fall. Neurology consulted for rule  out stroke. Imaging revealed no acute ischemia. EEG with no seizure activity. Plan for follow up as outpatient for additional testing. Cardiology consulted for elevated troponin. No indication of ACS, plan for outpatient follow up for nuclear stress test. PT/OT consulted and recommending moderate intensity rehab at discharge. Patient agreeable to SNF placement for strengthening prior to going home. Cleared for discharge by consultants.     Pertinent Physical Exam At Time of Discharge  Physical Exam  Constitutional:       Appearance: Normal appearance.   HENT:      Head: Normocephalic and atraumatic.      Mouth/Throat:      Mouth: Mucous membranes are moist.      Pharynx: Oropharynx is clear.   Eyes:      Extraocular Movements: Extraocular movements intact.      Conjunctiva/sclera: Conjunctivae normal.      Pupils: Pupils are equal, round, and reactive to light.   Cardiovascular:      Rate and Rhythm: Normal rate and regular rhythm.      Pulses: Normal pulses.      Heart sounds: Normal heart sounds.   Pulmonary:      Effort: Pulmonary effort is normal.      Breath sounds: Normal breath sounds.   Abdominal:      General: Bowel sounds are normal.      Palpations: Abdomen is soft.      Tenderness: There is no abdominal tenderness.   Musculoskeletal:      Cervical back: Normal range of motion and neck supple.   Skin:     General: Skin is warm and dry.      Capillary Refill: Capillary refill takes less than 2 seconds.   Neurological:      General: No focal deficit present.      Mental Status: He is alert and oriented to person, place, and time.   Psychiatric:         Mood and Affect: Mood normal.         Behavior: Behavior normal.         Outpatient Follow-Up  No future appointments.      Minda Dominguez, APRN-CNP

## 2024-09-10 NOTE — CARE PLAN
Problem: Skin  Goal: Decreased wound size/increased tissue granulation at next dressing change  Outcome: Progressing  Goal: Participates in plan/prevention/treatment measures  Outcome: Progressing  Goal: Prevent/manage excess moisture  Outcome: Progressing  Goal: Prevent/minimize sheer/friction injuries  Outcome: Progressing  Flowsheets (Taken 9/10/2024 0441)  Prevent/minimize sheer/friction injuries: Use pull sheet  Goal: Promote/optimize nutrition  Outcome: Progressing  Goal: Promote skin healing  Outcome: Progressing     Problem: Fall/Injury  Goal: Not fall by end of shift  Outcome: Progressing  Goal: Be free from injury by end of the shift  Outcome: Progressing  Goal: Verbalize understanding of personal risk factors for fall in the hospital  Outcome: Progressing  Goal: Verbalize understanding of risk factor reduction measures to prevent injury from fall in the home  Outcome: Progressing  Goal: Use assistive devices by end of the shift  Outcome: Progressing  Goal: Pace activities to prevent fatigue by end of the shift  Outcome: Progressing     Problem: Pain - Adult  Goal: Verbalizes/displays adequate comfort level or baseline comfort level  Outcome: Progressing     Problem: Safety - Adult  Goal: Free from fall injury  Outcome: Progressing     Problem: Discharge Planning  Goal: Discharge to home or other facility with appropriate resources  Outcome: Progressing     Problem: Chronic Conditions and Co-morbidities  Goal: Patient's chronic conditions and co-morbidity symptoms are monitored and maintained or improved  Outcome: Progressing   The patient's goals for the shift include PT OT tests    The clinical goals for the shift include no falls    Over the shift, the patient did make progress toward the goals.

## 2024-09-10 NOTE — PROGRESS NOTES
Subjective   No events overnight.  No further episodes of LOC.  He states he is feeling fine and seems to be answering questions today better than yesterday     Objective   Neurological Exam  Physical Exam    Last Recorded Vitals  Blood pressure 150/82, pulse 90, temperature 36.9 °C (98.4 °F), temperature source Oral, resp. rate 17, height 1.829 m (6'), weight 87.5 kg (192 lb 14.4 oz), SpO2 100%.      Neurologically, pt is awake and oriented x4. Attention, concentration, memory, cortical processing are intact. No aphasia  Cranial nerves: VFF, EOMI, No nystagmus, Face is symmetric to sensory and motor, no dysarthria, Tongue protrudes midline, Shrug symmetric  Motor: 5/5 strength B throughout, no tremor or asterixis  Sensation is intact bilaterally throughout  Coordination: no ataxia, no dysmetria/dysdiadochokinesia       Scheduled medications  amLODIPine, 2.5 mg, oral, Daily  atorvastatin, 40 mg, oral, Daily  clopidogrel, 75 mg, oral, Daily  finasteride, 5 mg, oral, Daily  insulin glargine, 15 Units, subcutaneous, Nightly  insulin lispro, 0-5 Units, subcutaneous, TID  metoprolol tartrate, 25 mg, oral, BID  pantoprazole, 40 mg, oral, Daily  perflutren lipid microspheres, 0.5-10 mL of dilution, intravenous, Once in imaging      Continuous medications     PRN medications  PRN medications: acetaminophen **OR** acetaminophen **OR** acetaminophen, calcium carbonate, dextrose, dextrose, glucagon, glucagon, ondansetron **OR** ondansetron     Results for orders placed or performed during the hospital encounter of 09/04/24 (from the past 96 hour(s))   POCT GLUCOSE   Result Value Ref Range    POCT Glucose 186 (H) 74 - 99 mg/dL   Basic Metabolic Panel   Result Value Ref Range    Glucose 128 (H) 65 - 99 mg/dL    Sodium 139 133 - 145 mmol/L    Potassium 3.5 3.4 - 5.1 mmol/L    Chloride 105 97 - 107 mmol/L    Bicarbonate 25 24 - 31 mmol/L    Urea Nitrogen 18 8 - 25 mg/dL    Creatinine 0.60 0.40 - 1.60 mg/dL    eGFR >90 >60  mL/min/1.73m*2    Calcium 8.7 8.5 - 10.4 mg/dL    Anion Gap 9 <=19 mmol/L   CBC   Result Value Ref Range    WBC 5.2 4.4 - 11.3 x10*3/uL    nRBC 0.0 0.0 - 0.0 /100 WBCs    RBC 3.86 (L) 4.50 - 5.90 x10*6/uL    Hemoglobin 11.4 (L) 13.5 - 17.5 g/dL    Hematocrit 35.1 (L) 41.0 - 52.0 %    MCV 91 80 - 100 fL    MCH 29.5 26.0 - 34.0 pg    MCHC 32.5 32.0 - 36.0 g/dL    RDW 15.2 (H) 11.5 - 14.5 %    Platelets 205 150 - 450 x10*3/uL   POCT GLUCOSE   Result Value Ref Range    POCT Glucose 123 (H) 74 - 99 mg/dL   POCT GLUCOSE   Result Value Ref Range    POCT Glucose 199 (H) 74 - 99 mg/dL   POCT GLUCOSE   Result Value Ref Range    POCT Glucose 151 (H) 74 - 99 mg/dL   POCT GLUCOSE   Result Value Ref Range    POCT Glucose 176 (H) 74 - 99 mg/dL   Basic Metabolic Panel   Result Value Ref Range    Glucose 112 (H) 65 - 99 mg/dL    Sodium 141 133 - 145 mmol/L    Potassium 3.5 3.4 - 5.1 mmol/L    Chloride 106 97 - 107 mmol/L    Bicarbonate 25 24 - 31 mmol/L    Urea Nitrogen 20 8 - 25 mg/dL    Creatinine 0.70 0.40 - 1.60 mg/dL    eGFR >90 >60 mL/min/1.73m*2    Calcium 8.9 8.5 - 10.4 mg/dL    Anion Gap 10 <=19 mmol/L   CBC   Result Value Ref Range    WBC 5.4 4.4 - 11.3 x10*3/uL    nRBC 0.0 0.0 - 0.0 /100 WBCs    RBC 3.64 (L) 4.50 - 5.90 x10*6/uL    Hemoglobin 10.9 (L) 13.5 - 17.5 g/dL    Hematocrit 33.1 (L) 41.0 - 52.0 %    MCV 91 80 - 100 fL    MCH 29.9 26.0 - 34.0 pg    MCHC 32.9 32.0 - 36.0 g/dL    RDW 15.1 (H) 11.5 - 14.5 %    Platelets 198 150 - 450 x10*3/uL   POCT GLUCOSE   Result Value Ref Range    POCT Glucose 105 (H) 74 - 99 mg/dL   POCT GLUCOSE   Result Value Ref Range    POCT Glucose 162 (H) 74 - 99 mg/dL   POCT GLUCOSE   Result Value Ref Range    POCT Glucose 148 (H) 74 - 99 mg/dL   POCT GLUCOSE   Result Value Ref Range    POCT Glucose 156 (H) 74 - 99 mg/dL   Basic Metabolic Panel   Result Value Ref Range    Glucose 165 (H) 65 - 99 mg/dL    Sodium 140 133 - 145 mmol/L    Potassium 3.5 3.4 - 5.1 mmol/L    Chloride 105 97 - 107  mmol/L    Bicarbonate 26 24 - 31 mmol/L    Urea Nitrogen 18 8 - 25 mg/dL    Creatinine 0.70 0.40 - 1.60 mg/dL    eGFR >90 >60 mL/min/1.73m*2    Calcium 8.9 8.5 - 10.4 mg/dL    Anion Gap 9 <=19 mmol/L   CBC   Result Value Ref Range    WBC 5.1 4.4 - 11.3 x10*3/uL    nRBC 0.0 0.0 - 0.0 /100 WBCs    RBC 3.53 (L) 4.50 - 5.90 x10*6/uL    Hemoglobin 10.6 (L) 13.5 - 17.5 g/dL    Hematocrit 31.0 (L) 41.0 - 52.0 %    MCV 88 80 - 100 fL    MCH 30.0 26.0 - 34.0 pg    MCHC 34.2 32.0 - 36.0 g/dL    RDW 15.0 (H) 11.5 - 14.5 %    Platelets 202 150 - 450 x10*3/uL   POCT GLUCOSE   Result Value Ref Range    POCT Glucose 163 (H) 74 - 99 mg/dL   POCT GLUCOSE   Result Value Ref Range    POCT Glucose 177 (H) 74 - 99 mg/dL   POCT GLUCOSE   Result Value Ref Range    POCT Glucose 163 (H) 74 - 99 mg/dL   POCT GLUCOSE   Result Value Ref Range    POCT Glucose 195 (H) 74 - 99 mg/dL   Basic Metabolic Panel   Result Value Ref Range    Glucose 136 (H) 65 - 99 mg/dL    Sodium 142 133 - 145 mmol/L    Potassium 3.9 3.4 - 5.1 mmol/L    Chloride 106 97 - 107 mmol/L    Bicarbonate 24 24 - 31 mmol/L    Urea Nitrogen 21 8 - 25 mg/dL    Creatinine 0.70 0.40 - 1.60 mg/dL    eGFR >90 >60 mL/min/1.73m*2    Calcium 9.4 8.5 - 10.4 mg/dL    Anion Gap 12 <=19 mmol/L   CBC   Result Value Ref Range    WBC 5.2 4.4 - 11.3 x10*3/uL    nRBC 0.0 0.0 - 0.0 /100 WBCs    RBC 3.61 (L) 4.50 - 5.90 x10*6/uL    Hemoglobin 10.8 (L) 13.5 - 17.5 g/dL    Hematocrit 33.0 (L) 41.0 - 52.0 %    MCV 91 80 - 100 fL    MCH 29.9 26.0 - 34.0 pg    MCHC 32.7 32.0 - 36.0 g/dL    RDW 15.3 (H) 11.5 - 14.5 %    Platelets 203 150 - 450 x10*3/uL   POCT GLUCOSE   Result Value Ref Range    POCT Glucose 166 (H) 74 - 99 mg/dL   Vitamin D 25-Hydroxy,Total (for eval of Vitamin D levels)   Result Value Ref Range    Vitamin D, 25-Hydroxy, Total 20 (L) 31 - 100 ng/mL   Vitamin B12   Result Value Ref Range    Vitamin B12 526 211 - 946 pg/mL   POCT GLUCOSE   Result Value Ref Range    POCT Glucose 244 (H) 74  - 99 mg/dL          EEG    Result Date: 9/9/2024  IMPRESSION This routine EEG is normal in awake and sleep state. No epileptiform discharges or lateralizing signs are seen. A full report will be scanned into the patient's chart at a later time. This report has been interpreted and electronically signed by    MR lumbar spine w and wo IV contrast    Result Date: 9/7/2024  Interpreted By:  Blu Keating, STUDY: MR LUMBAR SPINE W AND WO IV CONTRAST; ;  9/6/2024 8:13 pm   INDICATION: Signs/Symptoms:Lumbar radiculopathy vs left diabetic amyotrophy.     COMPARISON: None.   ACCESSION NUMBER(S): [id number]   ORDERING CLINICIAN: YOLI FLORES   TECHNIQUE: MRI of the lumbar spine was performed with the acquisition of sagittal T2, sagittal T1, sagittal STIR, axial T1, axial T2, and sagittal and axial T1 fat saturated postcontrast sequences.   Contrast: 19 mL of Dotarem was injected intravenously.   FINDINGS: This report assumes 5 non-rib bearing lumbar vertebral bodies. The lowest intervertebral disc will be labeled L5-S1.   There is grade 1 anterolisthesis at L4-L5. Vertebral body heights are maintained. There is mild intervertebral disc height narrowing at L3-L4 and L4-L5 with disc desiccation. There are chronic degenerative endplate changes at few levels including slight osteophytic spurring. There is mild STIR hyperintense signal located within the superior endplate of L5 which is most consistent with degenerative osseous edema versus reactive hypervascularity. Marrow signal is within normal limits.   The conus medullaris terminates at the level of T12 and is unremarkable in appearance. There is no mass or abnormal enhancement located within the conus medullaris or cauda equina nerve roots.   At T12-L1, there is no posterior disc contour abnormality. There is no spinal canal stenosis or neural foraminal narrowing. There is no facet osteoarthropathy.   At L1-L2, there is a small diffuse disc bulge. There is no spinal  canal stenosis. There is no striking neural foraminal narrowing or facet osteoarthropathy.   At L2-L3, there is no striking posterior disc contour abnormality. There is no spinal canal stenosis. There is extension of disc into the left neural foramen but there is no striking neural foraminal narrowing and there is no facet osteoarthropathy.   At L3-L4, there is a small diffuse disc bulge. There is no spinal canal stenosis. There is extension of disc into the bilateral neural foramina and there is minimal left neural foraminal narrowing. There is no striking facet osteoarthropathy.   At L4-L5, there is a small diffuse disc bulge, eccentric to the right, which partially effaces the right lateral recess. There is no striking spinal canal stenosis. There is extension of disc into the right neural foramen and along with facet osteoarthropathy causes mild neural foraminal narrowing. There is no striking narrowing of the left neural foramen. There is marked bilateral facet osteoarthropathy.   At L5-S1, there is no striking posterior disc contour abnormality. There is no spinal canal stenosis or neural foraminal narrowing. There is marked left facet osteoarthropathy.       Multilevel degenerative changes of the lumbar spine without striking spinal canal stenosis at any level. There is narrowing of the right lateral recess at L4-L5.   This study was interpreted at [facility].   MACRO: None   Signed by: Blu Keating 9/7/2024 10:54 AM Dictation workstation:   [id number]    MR brain wo IV contrast    Result Date: 9/5/2024  Interpreted By:  Lm Thomason, STUDY: MR BRAIN WO IV CONTRAST;  9/5/2024 9:18 pm   INDICATION: Signs/Symptoms:stroke like symptoms.   COMPARISON: CT brain 09/04/2024.   ACCESSION NUMBER(S): [id number]   ORDERING CLINICIAN: PEGGY BHATT   TECHNIQUE: Multiplanar, multi-sequence images of the brain were obtained without contrast.   FINDINGS: The craniovertebral  junction is normal.   Normal morphology of midline structures.   Diffusion weighted images show no evidence of acute ischemic infarct.   Biparietal encephalomalacia, left-greater-than-right. Moderate T2/FLAIR hyperintensities within the periventricular and subcortical white matter, nonspecific but likely reflecting sequela of small-vessel ischemic disease.   There is no evidence of an acute intraparenchymal hemorrhage, mass, mass-effect, midline shift or extra-axial fluid collection.   The ventricular size and cerebral volume are age-concordant.   The orbits and globes are unremarkable.   The paranasal sinuses show no hemorrhage or air-fluid levels.   The mastoid air cells are clear.       1. No diffusion restricting foci to suggest acute or subacute ischemia. 2. Biparietal chronic ischemic changes. 3. Nonspecific T2/FLAIR white matter hyperintensities likely reflecting sequela of small-vessel ischemic disease.       Signed by: Lm Thomason 9/5/2024 9:51 PM Dictation workstation:   [id number]    Transthoracic Echo (TTE) Complete    Result Date: 9/5/2024           [place] [address]            Phone [phone] TRANSTHORACIC ECHOCARDIOGRAM REPORT Patient Name:      ITZ LINDSAY       Reading Physician:    [id number] Luis Naty                                                                 Study Date:        9/5/2024              Ordering Provider:    [id number] HERMINIA TAYLOR MRN/PID:           [id number]              Fellow: Accession#:        [id number]          Nurse: Date of Birth/Age: 1950 / 74 years   Sonographer:          Iliana Oneil RDCS Gender:            M                     Additional Staff: Height:            182.88 cm             Admit Date: Weight:            70.76 kg              Admission Status:     Inpatient -                                                                 Routine BSA / BMI:         1.92 m2 / 21.16 kg/m2 Department Location:  [facility] Blood Pressure: 156 /87 mmHg Study Type:    TRANSTHORACIC ECHO (TTE) COMPLETE Diagnosis/ICD: Elevated Troponin-R79.89; Dizziness and giddiness-R42;                Syncope-R55 Indication:    elevated troponin near syncope dizziness CPT Codes:     Echo Complete w Full Doppler-[id number] Patient History: Smoker:            Former. Pertinent History: CVA and Syncope. dizziness near syncope fall weak elev                    troponin ho stroke. Study Detail: The following Echo studies were performed: 2D, M-Mode, Doppler and               color flow. Technically challenging study due to prominent lung               artifact.  PHYSICIAN INTERPRETATION: Left Ventricle: The left ventricular systolic function is normal, with a visually estimated ejection fraction of 55-60%. There are no regional wall motion abnormalities. The left ventricular cavity size is normal. Left ventricular diastolic filling was indeterminate. Left Atrium: The left atrium is normal in size. Right Ventricle: The right ventricle is normal in size. There is normal right ventricular global systolic function. Right Atrium: The right atrium is normal in size. Aortic Valve: The aortic valve appears abnormal. The aortic valve appears bicuspid. The aortic valve dimensionless index is 0.92. There is no evidence of aortic valve regurgitation. The peak instantaneous gradient of the aortic valve is 7.4 mmHg. The mean gradient of the aortic valve is 4.0 mmHg. Mitral Valve: The mitral valve is normal in structure. There is no evidence of mitral valve regurgitation. Tricuspid Valve: The tricuspid valve is structurally normal. No evidence of tricuspid regurgitation. Pulmonic Valve: The pulmonic valve is not well visualized. The pulmonic valve regurgitation was not well visualized. Pericardium: There is no pericardial effusion noted.  Aorta: The aortic root is normal.  CONCLUSIONS:  1. The left ventricular systolic function is normal, with a visually estimated ejection fraction of 55-60%.  2. Left ventricular diastolic filling was indeterminate.  3. There is normal right ventricular global systolic function.  4. The aortic valve appears bicuspid.  5. No aortic valve stenosis or regurgitation despite bicuspid appearance. QUANTITATIVE DATA SUMMARY: 2D MEASUREMENTS:                          Normal Ranges: IVSd:          0.69 cm   (0.6-1.1cm) LVPWd:         0.67 cm   (0.6-1.1cm) LVIDd:         4.26 cm   (3.9-5.9cm) LV Mass Index: 43.5 g/m2 LA VOLUME:                               Normal Ranges: LA Vol A4C:        73.8 ml    (22+/-6mL/m2) LA Vol A2C:        105.2 ml LA Vol BP:         88.1 ml LA Vol Index A4C:  38.5ml/m2 LA Vol Index A2C:  54.9 ml/m2 LA Vol Index BP:   46.0 ml/m2 LA Area A4C:       23.2 cm2 LA Area A2C:       27.7 cm2 LA Major Axis A4C: 6.2 cm LA Major Axis A2C: 6.2 cm LA Volume Index:   43.3 ml/m2 LA Vol A4C:        70.4 ml LA Vol A2C:        98.3 ml LA Vol Index BSA:  44.0 ml/m2 LV SYSTOLIC FUNCTION BY 2D PLANIMETRY (MOD):                      Normal Ranges: EF-A4C View:    48 % (>=55%) EF-A2C View:    51 % EF-Biplane:     51 % EF-Visual:      58 % LV EF Reported: 58 % LV DIASTOLIC FUNCTION:                         Normal Ranges: MV Peak E:    0.98 m/s  (0.7-1.2 m/s) MV Peak A:    1.32 m/s  (0.42-0.7 m/s) E/A Ratio:    0.74      (1.0-2.2) MV e'         0.074 m/s (>8.0) MV lateral e' 0.10 m/s MV medial e'  0.05 m/s E/e' Ratio:   13.14     (<8.0) a'            0.10 m/s MITRAL VALVE:                 Normal Ranges: MV DT: 169 msec (150-240msec) AORTIC VALVE:                                   Normal Ranges: AoV Vmax:                1.36 m/s (<=1.7m/s) AoV Peak P.4 mmHg (<20mmHg) AoV Mean P.0 mmHg (1.7-11.5mmHg) LVOT Max John:            0.98 m/s (<=1.1m/s) AoV VTI:                 24.50 cm (18-25cm) LVOT  VTI:                22.50 cm LVOT Diameter:           2.00 cm  (1.8-2.4cm) AoV Area, VTI:           2.89 cm2 (2.5-5.5cm2) AoV Area,Vmax:           2.26 cm2 (2.5-4.5cm2) AoV Dimensionless Index: 0.92  RIGHT VENTRICLE: RV Basal 2.69 cm RV Mid   2.09 cm RV Major 7.2 cm TAPSE:   32.0 mm RV s'    0.13 m/s TRICUSPID VALVE/RVSP:                             Normal Ranges: Peak TR Velocity: 2.36 m/s RV Syst Pressure: 30.3 mmHg (< 30mmHg) IVC Diam:         2.07 cm PULMONIC VALVE:                      Normal Ranges: PV Max John: 0.9 m/s  (0.6-0.9m/s) PV Max PG:  3.2 mmHg  [id number] Luis Alfonso DO Electronically signed on 9/5/2024 at 9:49:48 AM  ** Final **     ECG 12 lead    Result Date: 9/5/2024  Normal sinus rhythm Nonspecific ST and T wave abnormality Abnormal ECG No previous ECGs available Confirmed by Jesse Linder ([id number]) on 9/5/2024 9:47:02 AM    CT angio head and neck w and wo IV contrast    Result Date: 9/4/2024  Interpreted By:  Taryn Cash and Ebai Jerky STUDY: CT ANGIO HEAD AND NECK W AND WO IV CONTRAST;  9/4/2024 3:48 pm   INDICATION: Signs/Symptoms:LLE weakness, prior CVA.     COMPARISON: None.   ACCESSION NUMBER(S): [id number]   ORDERING CLINICIAN: ML FORBES   TECHNIQUE: After the administration of 75 mL Omnipaque 350, axial images of the head and neck were obtained. Coronal, sagittal, and 3-D reconstructions were provided for review.   FINDINGS: Please refer to dedicated CT of the head for additional findings which are reported separately.   CTA HEAD FINDINGS:   Anterior circulation: Atherosclerosis calcification of the right carotid siphon with mild luminal stenosis. There is a short segment of moderate stenosis involving right A3 segment anterior cerebral artery (series 8, image 100). There is a short segment of stenosis involving an inferior right M3 branch (series 8, image 138) there is distal reconstitution to normal caliber. There is also long segment luminal irregularity and mild stenosis of the  inferior M2 branch of the left MCA (series 8, image 51-54).   Otherwise, the bilateral intracranial internal carotid arteries, bilateral carotid terminals, bilateral proximal anterior and middle cerebral arteries are patent. No aneurysms or dissection   Posterior circulation: Atherosclerosis calcification of the bilateral intradural vertebral arteries resulting in mild luminal stenosis of the right proximal intradural vertebral artery. There is mild luminal stenosis of the distal right intradural vertebral artery. Vertebrobasilar junction is unremarkable. Multifocal atherosclerosis calcification of the basilar artery without significant luminal stenosis. The bilateral proximal posterior cerebral arteries are patent. Bilateral intracranial vertebral arteries, vertebrobasilar junction, basilar artery and proximal posterior cerebral arteries are normal.   CTA NECK FINDINGS:   Aorta and great vessels: Typical 3 great vessel branching pattern. There is atherosclerosis calcification of the aortic arch. No significant stenosis of the origin of the great vessels.   Right carotid vessels: The common carotid artery is normal. Atherosclerosis calcification of the carotid bifurcation and proximal cervical segment of the internal carotid artery without significant luminal stenosis by NASCET criteria. The remainder of the cervical internal carotid artery is patent. Brief retropharyngeal course of the cervical internal carotid artery.   Left carotid vessels: The common carotid artery is normal. Atherosclerosis calcification of the carotid bifurcation and proximal cervical segment of the internal carotid artery without significant luminal stenosis by NASCET criteria. Remainder cervical internal carotid artery is patent. Brief retropharyngeal course of the left cervical internal carotid artery   Vertebral vessels:  Prominent left cervical vertebral artery with a diminutive right cervical vertebral artery, a congenital variant.  Multifocal atherosclerosis calcification of the bilateral cervical vertebral arteries most notable for mild luminal stenosis of the V2 segment of the right vertebral artery.   Other: Multiple bilateral hypodense thyroid lesions measuring up to 1.2 cm in the right. Patulous appearance of the upper esophagus. Multilevel degenerative changes cervical spine. Incompletely visualized mildly prominent mediastinal lymph nodes are nonspecific, however may be reactive in the setting of infection.       1. No large branch vessel cutoffs of the intracranial or cervical vessels. 2. There is a short segment of moderate stenosis involving the right A3 segment of the anterior cerebral artery, inferior M3 branch of the right middle cerebral artery, and long segment luminal irregularity and mild stenosis of the inferior M2 branch of the left MCA. Findings may represent sequela intracranial atherosclerotic disease. 3. There is mild luminal stenosis of the proximal and distal right intradural vertebral artery. 4. Multiple hypodense bilateral thyroid nodules measuring up to 1.2 cm. Recommend correlation with thyroid ultrasound if not previously obtained.   I personally reviewed the images/study and I agree with the findings as stated by Resident Beverly Rios. This study was interpreted at [place], [place].   MACRO: None   Signed by: Taryn Cash 9/4/2024 4:24 PM Dictation workstation:   [id number]    CT cervical spine wo IV contrast    Result Date: 9/4/2024  Interpreted By:  Anjana Nicholas, STUDY: CT CERVICAL SPINE WO IV CONTRAST;  9/4/2024 2:29 pm   INDICATION: Signs/Symptoms:Fall.     COMPARISON: None.   ACCESSION NUMBER(S): [id number]   ORDERING CLINICIAN: FABBY WISE   TECHNIQUE: Axial CT images of the cervical spine are obtained. Axial, coronal and sagittal reconstructions are provided for review.   FINDINGS: There is mild anterior osteophytic spurring at C2-3 and C3-4. There is  marked anterior osteophytic spurring at C4-5 through C6-7. There is mild posterior osteophytic spurring at C3-4 through C7-T1.   Fractures: There is no evidence for an acute fracture of the cervical spine.   Vertebral Alignment: Within normal limits.   Craniocervical Junction: The odontoid process and craniocervical junction are intact.   Vertebrae/Disc Spaces:  The cervical vertebral body heights are intact and the disc spaces are preserved.   Prevertebral/Paraspinal Soft Tissues: The prevertebral and paraspinal soft tissues are unremarkable.         No evidence for an acute fracture or subluxation of the cervical spine.   MACRO: None   Signed by: Anjana Nicholas 9/4/2024 2:47 PM Dictation workstation:   [id number]    XR chest 1 view    Result Date: 9/4/2024  Interpreted By:  Anjana Nicholas, STUDY: XR CHEST 1 VIEW;  9/4/2024 2:27 pm   INDICATION: Signs/Symptoms:Altered mental status.   COMPARISON: None.   ACCESSION NUMBER(S): [id number]   ORDERING CLINICIAN: FABBY WISE   FINDINGS: CARDIOMEDIASTINAL SILHOUETTE: Cardiomediastinal silhouette is normal in size and configuration.     LUNGS: Lungs are clear.   ABDOMEN: No remarkable upper abdominal findings.     BONES: No acute osseous changes.       No acute cardiopulmonary process.   MACRO: None   Signed by: Anjana Nicholas 9/4/2024 2:45 PM Dictation workstation:   [id number]    CT brain attack head wo IV contrast    Result Date: 9/4/2024  Interpreted By:  Dalton Clancy, STUDY: CT BRAIN ATTACK HEAD WO IV CONTRAST;  9/4/2024 2:24 pm   INDICATION: Signs/Symptoms:Stroke Evaluation.   COMPARISON: None.   ACCESSION NUMBER(S): [id number]   ORDERING CLINICIAN: FABBY WISE   TECHNIQUE: Noncontrast axial CT scan of head was performed.   FINDINGS: Parenchyma: There is no intracranial hemorrhage. The grey-white differentiation is intact. There is no mass effect or midline shift. Left-greater-than-right biparietal encephalomalacia with calcification within the left  focus, compatible with sequela of prior infarcts. Patchy supratentorial hypodensity, nonspecific, but likely secondary to moderate chronic microvascular ischemia.   CSF Spaces: Mild generalized volume loss with concordant ventriculomegaly.   Extra-Axial Fluid: There is no extraaxial fluid collection.   Calvarium: The calvarium is unremarkable.   Paranasal sinuses: Visualized paranasal sinuses are clear.   Mastoids: Clear.   Orbits: Bilateral lens replacements.   Soft tissues: Unremarkable.       No acute intracranial hemorrhage, mass effect, or CT apparent acute infarct. Chronic microvascular ischemia, sequela of prior bilateral infarcts and involutional changes.   Dalton Clancy discussed the significance and urgency of this critical finding by telephone with  FABBYSA DAVENPORTDT on 9/4/2024 at 2:37 pm.  (**-RCF-**) Findings:  See findings.       Signed by: Dalton Clancy 9/4/2024 2:38 PM Dictation workstation:   [id number]       Assessment/Plan   Principal Problem:    Left leg weakness  Active Problems:    Type 2 diabetes mellitus (Multi)    73 yo M with remote h/o stroke now with progressive cognitive decline.  I suspect he is developing a vascular dementia due to the effects of time superimposed on the extensive cortical encephalomalacia.  No med changes at this time. He is okay for dispo to home and should follow up with me in 4-6 wks    I personally spent 32 minutes today, exclusive of procedures, providing care for this patient, including preparation, face to face time, documentation and other services such as review of medical records, diagnostic result, patient education, counseling, coordination of care as specified in the encounter.

## 2024-09-10 NOTE — NURSING NOTE
Rounded on pt. Pt laying in bed, eyes closed, respirations even and unlabored. Pt appears to be sleeping and in no apparent pain or distress. Uneventful night with no changes since prior assessment. Call bell and belongings are placed in reach. Telemetry leads connected and reading on monitor. Bed alarm refused. Continuing to monitor.

## 2024-09-10 NOTE — NURSING NOTE
Obtained bedside shift report from nightshift RN and patient was up sitting at bedside during report. Patient denied any questions at this time, assisted in ordering breakfast. Call light in reach

## 2024-09-10 NOTE — PROGRESS NOTES
Occupational Therapy    OT Treatment    Patient Name: Gabriel Skinner  MRN: [id number]  Today's Date: 9/10/2024  Time Calculation  Start Time: 1009  Stop Time: 1033  Time Calculation (min): 24 min        Assessment:  OT Assessment: Gradual progress made towards OT goals. Continue with current OT POC to increase strength, balance and functional tolerance to maximize safety and independence during ADLs.  Evaluation/Treatment Tolerance: Patient tolerated treatment well  End of Session Communication: Bedside nurse  End of Session Patient Position: Bed, 2 rail up, Alarm off, not on at start of session (RN aware of pt positioning)  OT Assessment Results: Decreased ADL status, Decreased cognition, Decreased endurance, Visual deficit, Decreased functional mobility, Decreased IADLs  Evaluation/Treatment Tolerance: Patient tolerated treatment well  Plan:  Treatment Interventions: ADL retraining, Visual perceptual retraining, Functional transfer training, UE strengthening/ROM, Endurance training, Cognitive reorientation, Patient/family training, Equipment evaluation/education, Neuromuscular reeducation, Compensatory technique education  OT Frequency: 4 times per week  OT Discharge Recommendations: Moderate intensity level of continued care  Equipment Recommended upon Discharge: Wheeled walker  OT Recommended Transfer Status: Minimal assist  OT - OK to Discharge: Yes  Treatment Interventions: ADL retraining, Visual perceptual retraining, Functional transfer training, UE strengthening/ROM, Endurance training, Cognitive reorientation, Patient/family training, Equipment evaluation/education, Neuromuscular reeducation, Compensatory technique education    Subjective   Previous Visit Info:  OT Last Visit  OT Received On: 09/10/24  General:  General  Reason for Referral: weakness, impaired ADL's/mobility  Past Medical History Relevant to Rehab: DM, CVA with Left sided weakness; HTN, falls  Prior to Session Communication: Bedside  nurse  Patient Position Received: Up in room, Alarm off, not on at start of session  General Comment: Pt cleared for OT session per nursing, upon arrival pt up in room sorting through belongings.  Precautions:  Hearing/Visual Limitations: wears glasses with decreased Right peripheral vision;  mildly [place]  Medical Precautions: Fall precautions  Precautions Comment: telemetry            Pain:  Pain Assessment  Pain Assessment: 0-10  0-10 (Numeric) Pain Score: 0 - No pain  Clinical Progression: Not changed    Objective    Cognition:  Cognition  Overall Cognitive Status: Impaired  Orientation Level: Disoriented to time, Disoriented to place  Following Commands: Follows one step commands with repetition  Safety/Judgement: Exceptions to WFL  Insight: Mild  Impulsive: Moderately  Task Initiation: Initiates with cues  Processing Speed: Delayed  Coordination:  Movements are Fluid and Coordinated: No  Upper Body Coordination: slower rate and accuracy of movments L>R  Lower Body Coordination: slower rate of movements L>R  Activities of Daily Living: Grooming  Grooming Comments: pt declined participation in ADLs despite education and encouragement    Toileting  Toileting Comments: toileting simulation completed seated on commode with close supervision. Min verbal cues required for proper hand placement to increase safety and decrease risk of falls.  Functional Standing Tolerance:     Bed Mobility/Transfers: Transfers  Transfer: Yes  Transfer 1  Trials/Comments 1: sit<>stands completed from standard EOB, chair and toilet heights with close supervision.    Toilet Transfers  Toilet Transfer From: Bed  Toilet Transfer Type: To and from  Toilet Transfer to: Standard toilet  Toilet Transfer Technique: Ambulating  Toilet Transfers: Supervision  Toilet Transfers Comments: min verbal cues for use of grab bars    Functional Mobility:  Functional Mobility  Functional Mobility Performed: Yes  Functional Mobility 1  Surface 1: Level  tile  Device 1: Single point cane  Assistance 1: Close supervision  Comments 1: Functional mobility trials completed at extended household distances to increase functional tolerance and assess safety awareness to increase overall safety and independence prior to home-going.    Therapy/Activity: Therapeutic Exercise  Therapeutic Exercise Performed: Yes  Therapeutic Exercise Activity 1: BUE exercises completed 2x10 with min resistance for following exercises: wrist flexion/ extension, pronation/supination, bicep curl, triceps extension, and shoulder press  Therapeutic Exercise Activity 2: Exercises completed this date to increase strength and functional tolerance for safety and ease of ADL/ADL transfer completion. Verbal instruction and demonstration provided to ensure proper muscle recruitment.      Outcome Measures:[facility] Daily Activity  Putting on and taking off regular lower body clothing: A lot  Bathing (including washing, rinsing, drying): A little  Putting on and taking off regular upper body clothing: A little  Toileting, which includes using toilet, bedpan or urinal: A lot  Taking care of personal grooming such as brushing teeth: None  Eating Meals: None  Daily Activity - Total Score: 18        Education Documentation  Home Exercise Program, taught by CHARLA Pastrana at 9/10/2024 12:58 PM.  Learner: Patient  Readiness: Acceptance  Method: Explanation, Demonstration  Response: Needs Reinforcement    ADL Training, taught by CHARLA Pastrana at 9/10/2024 12:58 PM.  Learner: Patient  Readiness: Acceptance  Method: Explanation, Demonstration  Response: Needs Reinforcement    Education Comments  Education provided on role of OT/POC, safety awareness throughout functional tasks/transfers, importance of activity/ rest routine, EC/WS techniques, and use of call light for assistance. Questions, comments and concerns addressed regarding OT.  \    Goals:  Encounter Problems       Encounter Problems (Active)        OT Goals       Pt will complete all functional transfers and mobility with mod indep using a RW or cane. (Progressing)       Start:  09/05/24    Expected End:  10/06/24            Pt will tolerate functional mobility for a household distance using a RW or cane with mod indep. (Progressing)       Start:  09/05/24    Expected End:  10/06/24            Pt will complete all ADL's with mod indep/indep using adaptive equipment as needed. (Progressing)       Start:  09/05/24    Expected End:  10/06/24

## 2024-09-10 NOTE — CONSULTS
Subjective   73 yo M with remote h/o bilateral strokes in the bilateral parietal lobes presents with difficulty ambulating in the midst of progressive dementia.  The pt's wife provides Hx.  She was certain he had had another stroke because of the relative suddenness in his change in mental status and episodes of staring.  Today he remains with cognitive impairment, but no focal neurologic deficits on physical neuro exam.       Objective   Neurological Exam  Physical Exam    Last Recorded Vitals  Blood pressure 134/76, pulse 78, temperature 36.8 °C (98.2 °F), temperature source Oral, resp. rate 16, height 1.829 m (6'), weight 90.2 kg (198 lb 13.7 oz), SpO2 98%.      Neurologically, pt is awake and oriented x1-2. Attention, concentration, memory, cortical processing are impaired, but no overt confusion/delerium. No aphasia  Cranial nerves: VFF, EOMI, No nystagmus, Face is symmetric to sensory and motor, no dysarthria, Tongue protrudes midline, Shrug symmetric  Motor: 5/5 strength B throughout, no tremor or asterixis  Sensation is intact bilaterally throughout  Coordination: no ataxia, no dysmetria/dysdiadochokinesia         Scheduled medications  amLODIPine, 2.5 mg, oral, Daily  atorvastatin, 40 mg, oral, Daily  clopidogrel, 75 mg, oral, Daily  finasteride, 5 mg, oral, Daily  insulin glargine, 15 Units, subcutaneous, Nightly  insulin lispro, 0-5 Units, subcutaneous, TID  metoprolol tartrate, 25 mg, oral, BID  pantoprazole, 40 mg, oral, Daily  perflutren lipid microspheres, 0.5-10 mL of dilution, intravenous, Once in imaging      Continuous medications     PRN medications  PRN medications: acetaminophen **OR** acetaminophen **OR** acetaminophen, calcium carbonate, dextrose, dextrose, glucagon, glucagon, ondansetron **OR** ondansetron     Results for orders placed or performed during the hospital encounter of 09/04/24 (from the past 96 hour(s))   CBC   Result Value Ref Range    WBC 6.7 4.4 - 11.3 x10*3/uL    nRBC 0.0  0.0 - 0.0 /100 WBCs    RBC 3.74 (L) 4.50 - 5.90 x10*6/uL    Hemoglobin 11.2 (L) 13.5 - 17.5 g/dL    Hematocrit 33.2 (L) 41.0 - 52.0 %    MCV 89 80 - 100 fL    MCH 29.9 26.0 - 34.0 pg    MCHC 33.7 32.0 - 36.0 g/dL    RDW 14.8 (H) 11.5 - 14.5 %    Platelets 208 150 - 450 x10*3/uL   Basic metabolic panel   Result Value Ref Range    Glucose 58 (L) 65 - 99 mg/dL    Sodium 142 133 - 145 mmol/L    Potassium 3.0 (L) 3.4 - 5.1 mmol/L    Chloride 106 97 - 107 mmol/L    Bicarbonate 25 24 - 31 mmol/L    Urea Nitrogen 24 8 - 25 mg/dL    Creatinine 0.70 0.40 - 1.60 mg/dL    eGFR >90 >60 mL/min/1.73m*2    Calcium 8.8 8.5 - 10.4 mg/dL    Anion Gap 11 <=19 mmol/L   POCT GLUCOSE   Result Value Ref Range    POCT Glucose 103 (H) 74 - 99 mg/dL   POCT GLUCOSE   Result Value Ref Range    POCT Glucose 205 (H) 74 - 99 mg/dL   POCT GLUCOSE   Result Value Ref Range    POCT Glucose 187 (H) 74 - 99 mg/dL   POCT GLUCOSE   Result Value Ref Range    POCT Glucose 116 (H) 74 - 99 mg/dL   POCT GLUCOSE   Result Value Ref Range    POCT Glucose 186 (H) 74 - 99 mg/dL   Basic Metabolic Panel   Result Value Ref Range    Glucose 128 (H) 65 - 99 mg/dL    Sodium 139 133 - 145 mmol/L    Potassium 3.5 3.4 - 5.1 mmol/L    Chloride 105 97 - 107 mmol/L    Bicarbonate 25 24 - 31 mmol/L    Urea Nitrogen 18 8 - 25 mg/dL    Creatinine 0.60 0.40 - 1.60 mg/dL    eGFR >90 >60 mL/min/1.73m*2    Calcium 8.7 8.5 - 10.4 mg/dL    Anion Gap 9 <=19 mmol/L   CBC   Result Value Ref Range    WBC 5.2 4.4 - 11.3 x10*3/uL    nRBC 0.0 0.0 - 0.0 /100 WBCs    RBC 3.86 (L) 4.50 - 5.90 x10*6/uL    Hemoglobin 11.4 (L) 13.5 - 17.5 g/dL    Hematocrit 35.1 (L) 41.0 - 52.0 %    MCV 91 80 - 100 fL    MCH 29.5 26.0 - 34.0 pg    MCHC 32.5 32.0 - 36.0 g/dL    RDW 15.2 (H) 11.5 - 14.5 %    Platelets 205 150 - 450 x10*3/uL   POCT GLUCOSE   Result Value Ref Range    POCT Glucose 123 (H) 74 - 99 mg/dL   POCT GLUCOSE   Result Value Ref Range    POCT Glucose 199 (H) 74 - 99 mg/dL   POCT GLUCOSE   Result  Value Ref Range    POCT Glucose 151 (H) 74 - 99 mg/dL   POCT GLUCOSE   Result Value Ref Range    POCT Glucose 176 (H) 74 - 99 mg/dL   Basic Metabolic Panel   Result Value Ref Range    Glucose 112 (H) 65 - 99 mg/dL    Sodium 141 133 - 145 mmol/L    Potassium 3.5 3.4 - 5.1 mmol/L    Chloride 106 97 - 107 mmol/L    Bicarbonate 25 24 - 31 mmol/L    Urea Nitrogen 20 8 - 25 mg/dL    Creatinine 0.70 0.40 - 1.60 mg/dL    eGFR >90 >60 mL/min/1.73m*2    Calcium 8.9 8.5 - 10.4 mg/dL    Anion Gap 10 <=19 mmol/L   CBC   Result Value Ref Range    WBC 5.4 4.4 - 11.3 x10*3/uL    nRBC 0.0 0.0 - 0.0 /100 WBCs    RBC 3.64 (L) 4.50 - 5.90 x10*6/uL    Hemoglobin 10.9 (L) 13.5 - 17.5 g/dL    Hematocrit 33.1 (L) 41.0 - 52.0 %    MCV 91 80 - 100 fL    MCH 29.9 26.0 - 34.0 pg    MCHC 32.9 32.0 - 36.0 g/dL    RDW 15.1 (H) 11.5 - 14.5 %    Platelets 198 150 - 450 x10*3/uL   POCT GLUCOSE   Result Value Ref Range    POCT Glucose 105 (H) 74 - 99 mg/dL   POCT GLUCOSE   Result Value Ref Range    POCT Glucose 162 (H) 74 - 99 mg/dL   POCT GLUCOSE   Result Value Ref Range    POCT Glucose 148 (H) 74 - 99 mg/dL   POCT GLUCOSE   Result Value Ref Range    POCT Glucose 156 (H) 74 - 99 mg/dL   Basic Metabolic Panel   Result Value Ref Range    Glucose 165 (H) 65 - 99 mg/dL    Sodium 140 133 - 145 mmol/L    Potassium 3.5 3.4 - 5.1 mmol/L    Chloride 105 97 - 107 mmol/L    Bicarbonate 26 24 - 31 mmol/L    Urea Nitrogen 18 8 - 25 mg/dL    Creatinine 0.70 0.40 - 1.60 mg/dL    eGFR >90 >60 mL/min/1.73m*2    Calcium 8.9 8.5 - 10.4 mg/dL    Anion Gap 9 <=19 mmol/L   CBC   Result Value Ref Range    WBC 5.1 4.4 - 11.3 x10*3/uL    nRBC 0.0 0.0 - 0.0 /100 WBCs    RBC 3.53 (L) 4.50 - 5.90 x10*6/uL    Hemoglobin 10.6 (L) 13.5 - 17.5 g/dL    Hematocrit 31.0 (L) 41.0 - 52.0 %    MCV 88 80 - 100 fL    MCH 30.0 26.0 - 34.0 pg    MCHC 34.2 32.0 - 36.0 g/dL    RDW 15.0 (H) 11.5 - 14.5 %    Platelets 202 150 - 450 x10*3/uL   POCT GLUCOSE   Result Value Ref Range    POCT  Glucose 163 (H) 74 - 99 mg/dL   POCT GLUCOSE   Result Value Ref Range    POCT Glucose 177 (H) 74 - 99 mg/dL   POCT GLUCOSE   Result Value Ref Range    POCT Glucose 163 (H) 74 - 99 mg/dL   POCT GLUCOSE   Result Value Ref Range    POCT Glucose 195 (H) 74 - 99 mg/dL          EEG    Result Date: 9/9/2024  IMPRESSION This routine EEG is normal in awake and sleep state. No epileptiform discharges or lateralizing signs are seen. A full report will be scanned into the patient's chart at a later time. This report has been interpreted and electronically signed by    MR lumbar spine w and wo IV contrast    Result Date: 9/7/2024  Interpreted By:  Blu Keating, STUDY: MR LUMBAR SPINE W AND WO IV CONTRAST; ;  9/6/2024 8:13 pm   INDICATION: Signs/Symptoms:Lumbar radiculopathy vs left diabetic amyotrophy.     COMPARISON: None.   ACCESSION NUMBER(S): [id number]   ORDERING CLINICIAN: YOLI FLORES   TECHNIQUE: MRI of the lumbar spine was performed with the acquisition of sagittal T2, sagittal T1, sagittal STIR, axial T1, axial T2, and sagittal and axial T1 fat saturated postcontrast sequences.   Contrast: 19 mL of Dotarem was injected intravenously.   FINDINGS: This report assumes 5 non-rib bearing lumbar vertebral bodies. The lowest intervertebral disc will be labeled L5-S1.   There is grade 1 anterolisthesis at L4-L5. Vertebral body heights are maintained. There is mild intervertebral disc height narrowing at L3-L4 and L4-L5 with disc desiccation. There are chronic degenerative endplate changes at few levels including slight osteophytic spurring. There is mild STIR hyperintense signal located within the superior endplate of L5 which is most consistent with degenerative osseous edema versus reactive hypervascularity. Marrow signal is within normal limits.   The conus medullaris terminates at the level of T12 and is unremarkable in appearance. There is no mass or abnormal enhancement located within the conus medullaris or cauda  equina nerve roots.   At T12-L1, there is no posterior disc contour abnormality. There is no spinal canal stenosis or neural foraminal narrowing. There is no facet osteoarthropathy.   At L1-L2, there is a small diffuse disc bulge. There is no spinal canal stenosis. There is no striking neural foraminal narrowing or facet osteoarthropathy.   At L2-L3, there is no striking posterior disc contour abnormality. There is no spinal canal stenosis. There is extension of disc into the left neural foramen but there is no striking neural foraminal narrowing and there is no facet osteoarthropathy.   At L3-L4, there is a small diffuse disc bulge. There is no spinal canal stenosis. There is extension of disc into the bilateral neural foramina and there is minimal left neural foraminal narrowing. There is no striking facet osteoarthropathy.   At L4-L5, there is a small diffuse disc bulge, eccentric to the right, which partially effaces the right lateral recess. There is no striking spinal canal stenosis. There is extension of disc into the right neural foramen and along with facet osteoarthropathy causes mild neural foraminal narrowing. There is no striking narrowing of the left neural foramen. There is marked bilateral facet osteoarthropathy.   At L5-S1, there is no striking posterior disc contour abnormality. There is no spinal canal stenosis or neural foraminal narrowing. There is marked left facet osteoarthropathy.       Multilevel degenerative changes of the lumbar spine without striking spinal canal stenosis at any level. There is narrowing of the right lateral recess at L4-L5.   This study was interpreted at [facility].   MACRO: None   Signed by: Blu Keating 9/7/2024 10:54 AM Dictation workstation:   [id number]    MR brain wo IV contrast    Result Date: 9/5/2024  Interpreted By:  Lm Thomason, STUDY: MR BRAIN WO IV CONTRAST;  9/5/2024 9:18 pm   INDICATION: Signs/Symptoms:stroke like  symptoms.   COMPARISON: CT brain 09/04/2024.   ACCESSION NUMBER(S): [id number]   ORDERING CLINICIAN: PEGGY BHATT   TECHNIQUE: Multiplanar, multi-sequence images of the brain were obtained without contrast.   FINDINGS: The craniovertebral junction is normal.   Normal morphology of midline structures.   Diffusion weighted images show no evidence of acute ischemic infarct.   Biparietal encephalomalacia, left-greater-than-right. Moderate T2/FLAIR hyperintensities within the periventricular and subcortical white matter, nonspecific but likely reflecting sequela of small-vessel ischemic disease.   There is no evidence of an acute intraparenchymal hemorrhage, mass, mass-effect, midline shift or extra-axial fluid collection.   The ventricular size and cerebral volume are age-concordant.   The orbits and globes are unremarkable.   The paranasal sinuses show no hemorrhage or air-fluid levels.   The mastoid air cells are clear.       1. No diffusion restricting foci to suggest acute or subacute ischemia. 2. Biparietal chronic ischemic changes. 3. Nonspecific T2/FLAIR white matter hyperintensities likely reflecting sequela of small-vessel ischemic disease.       Signed by: Lm Thomason 9/5/2024 9:51 PM Dictation workstation:   [id number]    Transthoracic Echo (TTE) Complete    Result Date: 9/5/2024           [place] [address]            Phone [phone] TRANSTHORACIC ECHOCARDIOGRAM REPORT Patient Name:      ITZ LINDSAY       Reading Physician:    [id number] [facility] Study Date:        9/5/2024              Ordering Provider:    [id number] HERMINIA TAYLOR MRN/PID:           [id number]              Fellow: Accession#:        [id number]          Nurse: Date of Birth/Age: 1950 / 74 years   Sonographer:          Iliana Oneil                                                                 [facility] [[email or web address]] Gender:            M                     Additional Staff: Height:            182.88 cm             Admit Date: Weight:            70.76 kg              Admission Status:     Inpatient -                                                                Routine BSA / BMI:         1.92 m2 / 21.16 kg/m2 Department Location:  [facility] Blood Pressure: 156 /87 mmHg Study Type:    TRANSTHORACIC ECHO (TTE) COMPLETE Diagnosis/ICD: Elevated Troponin-R79.89; Dizziness and giddiness-R42;                Syncope-R55 Indication:    elevated troponin near syncope dizziness CPT Codes:     Echo Complete w Full Doppler-[id number] Patient History: Smoker:            Former. Pertinent History: CVA and Syncope. dizziness near syncope fall weak elev                    troponin ho stroke. Study Detail: The following Echo studies were performed: 2D, M-Mode, Doppler and               color flow. Technically challenging study due to prominent lung               artifact.  PHYSICIAN INTERPRETATION: Left Ventricle: The left ventricular systolic function is normal, with a visually estimated ejection fraction of 55-60%. There are no regional wall motion abnormalities. The left ventricular cavity size is normal. Left ventricular diastolic filling was indeterminate. Left Atrium: The left atrium is normal in size. Right Ventricle: The right ventricle is normal in size. There is normal right ventricular global systolic function. Right Atrium: The right atrium is normal in size. Aortic Valve: The aortic valve appears abnormal. The aortic valve appears bicuspid. The aortic valve dimensionless index is 0.92. There is no evidence of aortic valve regurgitation. The peak instantaneous gradient of the aortic valve is 7.4 mmHg. The mean gradient of the aortic valve is 4.0 mmHg. Mitral Valve: The mitral valve is normal in structure. There is no evidence of mitral valve regurgitation. Tricuspid Valve: The  tricuspid valve is structurally normal. No evidence of tricuspid regurgitation. Pulmonic Valve: The pulmonic valve is not well visualized. The pulmonic valve regurgitation was not well visualized. Pericardium: There is no pericardial effusion noted. Aorta: The aortic root is normal.  CONCLUSIONS:  1. The left ventricular systolic function is normal, with a visually estimated ejection fraction of 55-60%.  2. Left ventricular diastolic filling was indeterminate.  3. There is normal right ventricular global systolic function.  4. The aortic valve appears bicuspid.  5. No aortic valve stenosis or regurgitation despite bicuspid appearance. QUANTITATIVE DATA SUMMARY: 2D MEASUREMENTS:                          Normal Ranges: IVSd:          0.69 cm   (0.6-1.1cm) LVPWd:         0.67 cm   (0.6-1.1cm) LVIDd:         4.26 cm   (3.9-5.9cm) LV Mass Index: 43.5 g/m2 LA VOLUME:                               Normal Ranges: LA Vol A4C:        73.8 ml    (22+/-6mL/m2) LA Vol A2C:        105.2 ml LA Vol BP:         88.1 ml LA Vol Index A4C:  38.5ml/m2 LA Vol Index A2C:  54.9 ml/m2 LA Vol Index BP:   46.0 ml/m2 LA Area A4C:       23.2 cm2 LA Area A2C:       27.7 cm2 LA Major Axis A4C: 6.2 cm LA Major Axis A2C: 6.2 cm LA Volume Index:   43.3 ml/m2 LA Vol A4C:        70.4 ml LA Vol A2C:        98.3 ml LA Vol Index BSA:  44.0 ml/m2 LV SYSTOLIC FUNCTION BY 2D PLANIMETRY (MOD):                      Normal Ranges: EF-A4C View:    48 % (>=55%) EF-A2C View:    51 % EF-Biplane:     51 % EF-Visual:      58 % LV EF Reported: 58 % LV DIASTOLIC FUNCTION:                         Normal Ranges: MV Peak E:    0.98 m/s  (0.7-1.2 m/s) MV Peak A:    1.32 m/s  (0.42-0.7 m/s) E/A Ratio:    0.74      (1.0-2.2) MV e'         0.074 m/s (>8.0) MV lateral e' 0.10 m/s MV medial e'  0.05 m/s E/e' Ratio:   13.14     (<8.0) a'            0.10 m/s MITRAL VALVE:                 Normal Ranges: MV DT: 169 msec (150-240msec) AORTIC VALVE:                                    Normal Ranges: AoV Vmax:                1.36 m/s (<=1.7m/s) AoV Peak P.4 mmHg (<20mmHg) AoV Mean P.0 mmHg (1.7-11.5mmHg) LVOT Max John:            0.98 m/s (<=1.1m/s) AoV VTI:                 24.50 cm (18-25cm) LVOT VTI:                22.50 cm LVOT Diameter:           2.00 cm  (1.8-2.4cm) AoV Area, VTI:           2.89 cm2 (2.5-5.5cm2) AoV Area,Vmax:           2.26 cm2 (2.5-4.5cm2) AoV Dimensionless Index: 0.92  RIGHT VENTRICLE: RV Basal 2.69 cm RV Mid   2.09 cm RV Major 7.2 cm TAPSE:   32.0 mm RV s'    0.13 m/s TRICUSPID VALVE/RVSP:                             Normal Ranges: Peak TR Velocity: 2.36 m/s RV Syst Pressure: 30.3 mmHg (< 30mmHg) IVC Diam:         2.07 cm PULMONIC VALVE:                      Normal Ranges: PV Max John: 0.9 m/s  (0.6-0.9m/s) PV Max PG:  3.2 mmHg  [id number] [facility] YANES Electronically signed on 2024 at 9:49:48 AM  ** Final **     ECG 12 lead    Result Date: 2024  Normal sinus rhythm Nonspecific ST and T wave abnormality Abnormal ECG No previous ECGs available Confirmed by Jesse Linder ([id number]) on 2024 9:47:02 AM    CT angio head and neck w and wo IV contrast    Result Date: 2024  Interpreted By:  Taryn Cash and Ebai Jerky STUDY: CT ANGIO HEAD AND NECK W AND WO IV CONTRAST;  2024 3:48 pm   INDICATION: Signs/Symptoms:LLE weakness, prior CVA.     COMPARISON: None.   ACCESSION NUMBER(S): [id number]   ORDERING CLINICIAN: ML FORBES   TECHNIQUE: After the administration of 75 mL Omnipaque 350, axial images of the head and neck were obtained. Coronal, sagittal, and 3-D reconstructions were provided for review.   FINDINGS: Please refer to dedicated CT of the head for additional findings which are reported separately.   CTA HEAD FINDINGS:   Anterior circulation: Atherosclerosis calcification of the right carotid siphon with mild luminal stenosis. There is a short segment of moderate stenosis involving right A3 segment anterior cerebral  artery (series 8, image 100). There is a short segment of stenosis involving an inferior right M3 branch (series 8, image 138) there is distal reconstitution to normal caliber. There is also long segment luminal irregularity and mild stenosis of the inferior M2 branch of the left MCA (series 8, image 51-54).   Otherwise, the bilateral intracranial internal carotid arteries, bilateral carotid terminals, bilateral proximal anterior and middle cerebral arteries are patent. No aneurysms or dissection   Posterior circulation: Atherosclerosis calcification of the bilateral intradural vertebral arteries resulting in mild luminal stenosis of the right proximal intradural vertebral artery. There is mild luminal stenosis of the distal right intradural vertebral artery. Vertebrobasilar junction is unremarkable. Multifocal atherosclerosis calcification of the basilar artery without significant luminal stenosis. The bilateral proximal posterior cerebral arteries are patent. Bilateral intracranial vertebral arteries, vertebrobasilar junction, basilar artery and proximal posterior cerebral arteries are normal.   CTA NECK FINDINGS:   Aorta and great vessels: Typical 3 great vessel branching pattern. There is atherosclerosis calcification of the aortic arch. No significant stenosis of the origin of the great vessels.   Right carotid vessels: The common carotid artery is normal. Atherosclerosis calcification of the carotid bifurcation and proximal cervical segment of the internal carotid artery without significant luminal stenosis by NASCET criteria. The remainder of the cervical internal carotid artery is patent. Brief retropharyngeal course of the cervical internal carotid artery.   Left carotid vessels: The common carotid artery is normal. Atherosclerosis calcification of the carotid bifurcation and proximal cervical segment of the internal carotid artery without significant luminal stenosis by NASCET criteria. Remainder cervical  internal carotid artery is patent. Brief retropharyngeal course of the left cervical internal carotid artery   Vertebral vessels:  Prominent left cervical vertebral artery with a diminutive right cervical vertebral artery, a congenital variant. Multifocal atherosclerosis calcification of the bilateral cervical vertebral arteries most notable for mild luminal stenosis of the V2 segment of the right vertebral artery.   Other: Multiple bilateral hypodense thyroid lesions measuring up to 1.2 cm in the right. Patulous appearance of the upper esophagus. Multilevel degenerative changes cervical spine. Incompletely visualized mildly prominent mediastinal lymph nodes are nonspecific, however may be reactive in the setting of infection.       1. No large branch vessel cutoffs of the intracranial or cervical vessels. 2. There is a short segment of moderate stenosis involving the right A3 segment of the anterior cerebral artery, inferior M3 branch of the right middle cerebral artery, and long segment luminal irregularity and mild stenosis of the inferior M2 branch of the left MCA. Findings may represent sequela intracranial atherosclerotic disease. 3. There is mild luminal stenosis of the proximal and distal right intradural vertebral artery. 4. Multiple hypodense bilateral thyroid nodules measuring up to 1.2 cm. Recommend correlation with thyroid ultrasound if not previously obtained.   I personally reviewed the images/study and I agree with the findings as stated by Resident Beverly Rios. This study was interpreted at [place], [place].   MACRO: None   Signed by: Taryn Cash 9/4/2024 4:24 PM Dictation workstation:   [id number]    CT cervical spine wo IV contrast    Result Date: 9/4/2024  Interpreted By:  Anjana Nicholas, STUDY: CT CERVICAL SPINE WO IV CONTRAST;  9/4/2024 2:29 pm   INDICATION: Signs/Symptoms:Fall.     COMPARISON: None.   ACCESSION NUMBER(S): [id number]   ORDERING  CLINICIAN: FABBY WISE   TECHNIQUE: Axial CT images of the cervical spine are obtained. Axial, coronal and sagittal reconstructions are provided for review.   FINDINGS: There is mild anterior osteophytic spurring at C2-3 and C3-4. There is marked anterior osteophytic spurring at C4-5 through C6-7. There is mild posterior osteophytic spurring at C3-4 through C7-T1.   Fractures: There is no evidence for an acute fracture of the cervical spine.   Vertebral Alignment: Within normal limits.   Craniocervical Junction: The odontoid process and craniocervical junction are intact.   Vertebrae/Disc Spaces:  The cervical vertebral body heights are intact and the disc spaces are preserved.   Prevertebral/Paraspinal Soft Tissues: The prevertebral and paraspinal soft tissues are unremarkable.         No evidence for an acute fracture or subluxation of the cervical spine.   MACRO: None   Signed by: Anjana Nicholas 9/4/2024 2:47 PM Dictation workstation:   [id number]    XR chest 1 view    Result Date: 9/4/2024  Interpreted By:  Anjana Nicholas, STUDY: XR CHEST 1 VIEW;  9/4/2024 2:27 pm   INDICATION: Signs/Symptoms:Altered mental status.   COMPARISON: None.   ACCESSION NUMBER(S): [id number]   ORDERING CLINICIAN: FABBY WISE   FINDINGS: CARDIOMEDIASTINAL SILHOUETTE: Cardiomediastinal silhouette is normal in size and configuration.     LUNGS: Lungs are clear.   ABDOMEN: No remarkable upper abdominal findings.     BONES: No acute osseous changes.       No acute cardiopulmonary process.   MACRO: None   Signed by: Anjana Nicholas 9/4/2024 2:45 PM Dictation workstation:   [id number]    CT brain attack head wo IV contrast    Result Date: 9/4/2024  Interpreted By:  Dalton Clancy, STUDY: CT BRAIN ATTACK HEAD WO IV CONTRAST;  9/4/2024 2:24 pm   INDICATION: Signs/Symptoms:Stroke Evaluation.   COMPARISON: None.   ACCESSION NUMBER(S): [id number]   ORDERING CLINICIAN: FABBY WISE   TECHNIQUE: Noncontrast axial CT scan of head was  performed.   FINDINGS: Parenchyma: There is no intracranial hemorrhage. The grey-white differentiation is intact. There is no mass effect or midline shift. Left-greater-than-right biparietal encephalomalacia with calcification within the left focus, compatible with sequela of prior infarcts. Patchy supratentorial hypodensity, nonspecific, but likely secondary to moderate chronic microvascular ischemia.   CSF Spaces: Mild generalized volume loss with concordant ventriculomegaly.   Extra-Axial Fluid: There is no extraaxial fluid collection.   Calvarium: The calvarium is unremarkable.   Paranasal sinuses: Visualized paranasal sinuses are clear.   Mastoids: Clear.   Orbits: Bilateral lens replacements.   Soft tissues: Unremarkable.       No acute intracranial hemorrhage, mass effect, or CT apparent acute infarct. Chronic microvascular ischemia, sequela of prior bilateral infarcts and involutional changes.   Dalton Clancy discussed the significance and urgency of this critical finding by telephone with  FABBYSA DAVENPORTDT on 9/4/2024 at 2:37 pm.  (**-RCF-**) Findings:  See findings.       Signed by: Dalton Clancy 9/4/2024 2:38 PM Dictation workstation:   [id number]       Assessment/Plan   Principal Problem:    Left leg weakness  Active Problems:    Frequent falls    Elevated troponin    Disorientation    Primary hypertension    Type 2 diabetes mellitus (Multi)    Acute cystitis without hematuria    75 yo M with altered mental status in the setting of remote strokes.  I suspect a TME in the setting of acute medical issues, but will get EEG to rule out seizures.  I will reassess tomorrow.        I personally spent 60 minutes today, exclusive of procedures, providing care for this patient, including preparation, face to face time, documentation and other services such as review of medical records, diagnostic result, patient education, counseling, coordination of care as specified in the encounter.

## 2024-09-10 NOTE — NURSING NOTE
Took over care of pt at this time. Pt laying in bed, alert, aox3-4. Pt did not know the current year. Pt denies pain and has no needs or complaints at this time. Pt oriented to call bell and it and belongings are placed in reach. Telemetry leads connected and reading on monitor. Bed alarm refused as pt sits at side of bed to use urinal. Continuing to monitor.

## 2024-09-16 DIAGNOSIS — R29.898 WEAKNESS OF LEFT LEG: Primary | ICD-10-CM

## 2024-09-24 ENCOUNTER — OFFICE VISIT (OUTPATIENT)
Dept: CARDIOLOGY | Facility: CLINIC | Age: 74
End: 2024-09-24
Payer: MEDICARE

## 2024-09-24 VITALS
OXYGEN SATURATION: 99 % | DIASTOLIC BLOOD PRESSURE: 60 MMHG | WEIGHT: 186 LBS | SYSTOLIC BLOOD PRESSURE: 98 MMHG | BODY MASS INDEX: 25.23 KG/M2 | HEART RATE: 102 BPM

## 2024-09-24 DIAGNOSIS — I67.2 CEREBRAL ATHEROSCLEROSIS: ICD-10-CM

## 2024-09-24 DIAGNOSIS — I67.9 CVD (CEREBROVASCULAR DISEASE): Primary | ICD-10-CM

## 2024-09-24 DIAGNOSIS — I10 ESSENTIAL HYPERTENSION: ICD-10-CM

## 2024-09-24 DIAGNOSIS — Q23.1 BICUSPID AORTIC VALVE: ICD-10-CM

## 2024-09-24 PROBLEM — Q23.81 BICUSPID AORTIC VALVE: Status: ACTIVE | Noted: 2024-09-24

## 2024-09-24 PROCEDURE — 99214 OFFICE O/P EST MOD 30 MIN: CPT | Performed by: INTERNAL MEDICINE

## 2024-09-24 PROCEDURE — 1159F MED LIST DOCD IN RCRD: CPT | Performed by: INTERNAL MEDICINE

## 2024-09-24 PROCEDURE — 3078F DIAST BP <80 MM HG: CPT | Performed by: INTERNAL MEDICINE

## 2024-09-24 PROCEDURE — 3074F SYST BP LT 130 MM HG: CPT | Performed by: INTERNAL MEDICINE

## 2024-09-24 PROCEDURE — 3044F HG A1C LEVEL LT 7.0%: CPT | Performed by: INTERNAL MEDICINE

## 2024-09-24 PROCEDURE — 1111F DSCHRG MED/CURRENT MED MERGE: CPT | Performed by: INTERNAL MEDICINE

## 2024-09-24 PROCEDURE — 1126F AMNT PAIN NOTED NONE PRSNT: CPT | Performed by: INTERNAL MEDICINE

## 2024-09-24 ASSESSMENT — PAIN SCALES - GENERAL: PAINLEVEL: 0-NO PAIN

## 2024-09-24 ASSESSMENT — ENCOUNTER SYMPTOMS
LOSS OF SENSATION IN FEET: 0
NEAR-SYNCOPE: 0
IRREGULAR HEARTBEAT: 0
OCCASIONAL FEELINGS OF UNSTEADINESS: 1
MYALGIAS: 0
WEIGHT LOSS: 0
SHORTNESS OF BREATH: 0
COUGH: 0
DIAPHORESIS: 0
SYNCOPE: 0
ORTHOPNEA: 0
DIZZINESS: 0
DEPRESSION: 0
WEAKNESS: 0
CLAUDICATION: 0
PND: 0
WEIGHT GAIN: 0
FEVER: 0
PALPITATIONS: 0
WHEEZING: 0
DYSPNEA ON EXERTION: 0

## 2024-09-24 ASSESSMENT — PATIENT HEALTH QUESTIONNAIRE - PHQ9
1. LITTLE INTEREST OR PLEASURE IN DOING THINGS: NOT AT ALL
2. FEELING DOWN, DEPRESSED OR HOPELESS: NOT AT ALL
SUM OF ALL RESPONSES TO PHQ9 QUESTIONS 1 AND 2: 0

## 2024-09-24 NOTE — ASSESSMENT & PLAN NOTE
Will review his echo, bicuspid valve at this age in absence of aortopathy or aortic stenosis is likely fusion of 2 cusps of a tricuspid valve. Repeat echo in 1yr

## 2024-09-24 NOTE — PROGRESS NOTES
Subjective      Chief Complaint   Patient presents with    Follow-up    Hospital Follow-up        74-year-old male with previous history of CVA seen in the hospital earlier this month where he presented with left leg weakness and confusion.  His neurologic workup did reveal small vessel ischemic disease.  We saw him due to his elevated troponins which we thought were secondary.  He did get an echocardiogram that showed normal left ventricular size and function as well as a bicuspid aortic valve.  From a cardiac standpoint we decided to focus on  secondary prevention, he was on a high potency statin and an antiplatelet. He has been doing well, no issues with chest pain, dyspnea, palpitations          Review of Systems   Constitutional: Negative for diaphoresis, fever, weight gain and weight loss.   Eyes:  Negative for visual disturbance.   Cardiovascular:  Negative for chest pain, claudication, dyspnea on exertion, irregular heartbeat, leg swelling, near-syncope, orthopnea, palpitations, paroxysmal nocturnal dyspnea and syncope.   Respiratory:  Negative for cough, shortness of breath and wheezing.    Musculoskeletal:  Negative for muscle weakness and myalgias.   Neurological:  Negative for dizziness and weakness.   All other systems reviewed and are negative.       Past Medical History:   Diagnosis Date    Diabetes mellitus (Multi)     Hypertension     Stroke (Multi)         No past surgical history on file.     Social History     Socioeconomic History    Marital status:      Spouse name: Not on file    Number of children: Not on file    Years of education: Not on file    Highest education level: Not on file   Occupational History    Not on file   Tobacco Use    Smoking status: Former     Current packs/day: 0.00     Types: Cigarettes     Quit date:      Years since quittin.7     Passive exposure: Past    Smokeless tobacco: Never   Vaping Use    Vaping status: Never Used   Substance and Sexual Activity     Alcohol use: Never    Drug use: Never    Sexual activity: Not Currently   Other Topics Concern    Not on file   Social History Narrative    Not on file     Social Determinants of Health     Financial Resource Strain: Low Risk  (9/4/2024)    Overall Financial Resource Strain (CARDIA)     Difficulty of Paying Living Expenses: Not hard at all   Food Insecurity: No Food Insecurity (9/4/2024)    Hunger Vital Sign     Worried About Running Out of Food in the Last Year: Never true     Ran Out of Food in the Last Year: Never true   Transportation Needs: No Transportation Needs (9/4/2024)    PRAPARE - Transportation     Lack of Transportation (Medical): No     Lack of Transportation (Non-Medical): No   Physical Activity: Inactive (9/4/2024)    Exercise Vital Sign     Days of Exercise per Week: 0 days     Minutes of Exercise per Session: 0 min   Stress: No Stress Concern Present (9/4/2024)    [place] [employer] - Occupational Stress Questionnaire     Feeling of Stress : Not at all   Social Connections: Socially Isolated (9/4/2024)    Social Connection and Isolation Panel [NHANES]     Frequency of Communication with Friends and Family: Never     Frequency of Social Gatherings with Friends and Family: Never     Attends Confucianist Services: Never     Active Member of Clubs or Organizations: No     Attends Club or Organization Meetings: Never     Marital Status:    Intimate Partner Violence: Not At Risk (9/4/2024)    Humiliation, Afraid, Rape, and Kick questionnaire     Fear of Current or Ex-Partner: No     Emotionally Abused: No     Physically Abused: No     Sexually Abused: No   Housing Stability: Low Risk  (9/4/2024)    Housing Stability Vital Sign     Unable to Pay for Housing in the Last Year: No     Number of Times Moved in the Last Year: 0     Homeless in the Last Year: No        No family history on file.     OBJECTIVE:    Vitals:    09/24/24 0941   BP: 98/60   Pulse: 102   SpO2: 99%     "    Vitals reviewed.   Constitutional:       Appearance: Normal and healthy appearance. Not in distress.   Pulmonary:      Effort: Pulmonary effort is normal.      Breath sounds: Normal breath sounds.   Cardiovascular:      Normal rate. Regular rhythm. Normal S1. Normal S2.       Murmurs: There is no murmur.      No gallop.  No click.   Pulses:     Intact distal pulses.   Edema:     Peripheral edema absent.   Skin:     General: Skin is warm and dry.   Neurological:      General: No focal deficit present.          Lab Review:   No results found for: \"CHOL\", \"TRIG\", \"HDL\", \"LDLDIRECT\"    No results found for: \"LDLCALC\"     Bicuspid aortic valve ([facility])  Will review his echo, bicuspid valve at this age in absence of aortopathy or aortic stenosis is likely fusion of 2 cusps of a tricuspid valve. Repeat echo in 1yr    CVD (cerebrovascular disease)  Stable, continue high potency statin and plavix    Essential hypertension  Has actually bee hypotensive.Norvasc has been held. Encouraged to take BP at home 2-3x/week     "

## 2024-10-11 ENCOUNTER — HOSPITAL ENCOUNTER (OUTPATIENT)
Dept: RADIOLOGY | Facility: HOSPITAL | Age: 74
Discharge: HOME | End: 2024-10-11
Payer: MEDICARE

## 2024-10-11 DIAGNOSIS — M25.562 PAIN IN LEFT KNEE: ICD-10-CM
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
## 2024-10-11 DIAGNOSIS — G81.94 HEMIPLEGIA, UNSPECIFIED AFFECTING LEFT NONDOMINANT SIDE (MULTI): ICD-10-CM

## 2024-10-11 PROCEDURE — 73562 X-RAY EXAM OF KNEE 3: CPT | Mod: LT

## 2024-11-21 ENCOUNTER — APPOINTMENT (OUTPATIENT)
Dept: NEUROLOGY | Facility: CLINIC | Age: 74
End: 2024-11-21
Payer: MEDICARE

## 2024-11-21 ENCOUNTER — APPOINTMENT (OUTPATIENT)
Facility: CLINIC | Age: 74
End: 2024-11-21
Payer: MEDICARE

## 2025-02-27 ENCOUNTER — LAB (OUTPATIENT)
Dept: LAB | Facility: HOSPITAL | Age: 75
End: 2025-02-27
Payer: MEDICARE

## 2025-02-27 ENCOUNTER — HOSPITAL ENCOUNTER (OUTPATIENT)
Facility: CLINIC | Age: 75
Discharge: HOME | End: 2025-02-27
Payer: MEDICARE

## 2025-02-27 ENCOUNTER — OFFICE VISIT (OUTPATIENT)
Dept: NEUROLOGY | Facility: CLINIC | Age: 75
End: 2025-02-27
Payer: MEDICARE

## 2025-02-27 DIAGNOSIS — E11.44 DIABETIC AMYOTROPHY ASSOCIATED WITH TYPE 2 DIABETES MELLITUS (MULTI): Primary | ICD-10-CM

## 2025-02-27 DIAGNOSIS — R29.898 WEAKNESS OF LEFT LEG: ICD-10-CM

## 2025-02-27 DIAGNOSIS — E11.44 TYPE 2 DIABETES MELLITUS WITH DIABETIC AMYOTROPHY: Primary | ICD-10-CM

## 2025-02-27 LAB
HOLD SPECIMEN: NORMAL
PROT SERPL-MCNC: 7 G/DL (ref 6.4–8.2)

## 2025-02-27 PROCEDURE — 95886 MUSC TEST DONE W/N TEST COMP: CPT | Performed by: STUDENT IN AN ORGANIZED HEALTH CARE EDUCATION/TRAINING PROGRAM

## 2025-02-27 PROCEDURE — 95885 MUSC TST DONE W/NERV TST LIM: CPT | Performed by: STUDENT IN AN ORGANIZED HEALTH CARE EDUCATION/TRAINING PROGRAM

## 2025-02-27 PROCEDURE — 84165 PROTEIN E-PHORESIS SERUM: CPT

## 2025-02-27 PROCEDURE — 86334 IMMUNOFIX E-PHORESIS SERUM: CPT

## 2025-02-27 PROCEDURE — 95912 NRV CNDJ TEST 11-12 STUDIES: CPT | Performed by: STUDENT IN AN ORGANIZED HEALTH CARE EDUCATION/TRAINING PROGRAM

## 2025-02-27 PROCEDURE — 84155 ASSAY OF PROTEIN SERUM: CPT

## 2025-02-27 NOTE — PATIENT INSTRUCTIONS
We did nerve testing today which show that you have 2 different problems.  First you have a diabetic neuropathy which has likely been there for a very long time related to having diabetes.   The second issue which is more serious, is called diabetic amyotrophy which is causing injury to the nerves in the left leg causing weakness.  A potential treatment is called prednisone which is a steroid, which will need to talk with your doctor to help manage your blood sugar while on prednisone.    Please do the following:     I will talk with your primary care doctor to discuss a short course of insulin while you are on prednisone.  Then I will write this to your pharmacy.    Let us know if there are any issues.    Please get your blood drawn.    Make a follow-up appointment with me in 3 months.      You can reach us at our office phone: [phone].     Christophe Wills MD  Neurology and Neuromuscular Medicine   [facility]   [facility]  Primary office: [facility], [address]  [address] [place] [address]

## 2025-02-27 NOTE — PROGRESS NOTES
Neurology/Neuromuscular Follow Up     Gabriel Skinner, MRN: [id number], : 1950  Reason for Follow Up: No chief complaint on file.     Primary Care Physician: ALVARADO Cook-CNP     History Of Present Illness:  Mr. Skinner is a 74 y.o. male who presents for follow up of ***.     Since last visit, ***      To recap,      Relevant past medical, surgical, family, and social histories, along with ROS was reviewed and pertinent details noted above.     Medications and Allergies: Reviewed in EMR    Medications:  Current Outpatient Medications   Medication Instructions    acetaminophen (TYLENOL) 650 mg, oral, Every 4 hours PRN    amLODIPine (NORVASC) 2.5 mg, oral, Daily    atorvastatin (LIPITOR) 40 mg, oral, Daily    calcium carbonate (TUMS) 500 mg, oral, 4 times daily PRN    clopidogrel (PLAVIX) 75 mg, oral, Daily    finasteride (PROSCAR) 5 mg, oral, Daily, Do not crush, chew, or split.    glucagon (GLUCAGEN) 1 mg, intramuscular, Every 15 min PRN    insulin lispro 0-5 Units, subcutaneous, 3 times daily (morning, midday, late afternoon), Take as directed per insulin instructions.    Lantus U-100 Insulin 15 Units, subcutaneous, Nightly, Take as directed per insulin instructions.    metFORMIN (GLUCOPHAGE) 500 mg, oral, Daily with breakfast    metoprolol tartrate (LOPRESSOR) 25 mg, oral, 2 times daily       Physical Exam:  There were no vitals taken for this visit.       Results:    The following results, labs, and/or imaging were personally reviewed and/or listed for reference: See above/below      Assessment and Plan:  Gabriel Skinner is a 74 y.o. male      PLAN:  Problem List Items Addressed This Visit    None      Christophe Wills MD  Neurology and Neuromuscular Medicine   [facility]  [facility] [place]         The total time today was *** minutes. This time included pre-charting, obtaining history, physical examination, counseling and educating the  patient/family, independently interpreting results, and documentation.                           5          5  Elbow extension                      5          5  Finger flexion                           5          5  Finger extension                      5          5  Finger abduction                     5          5  Thumb abduction                    5          5  Hip flexion                               5          3  Hip extension                          5          3  Hip abduction                          5          3  Hip adduction                          5          3  Knee flexion                             5          5  Knee extension                        5          5  Ankle dorsiflexion                    5          5-  Ankle plantarflexion                5          5  Ankle Inversion                        5          5-  Ankle Eversion                         5          5-  Big toe extension                    5          5-  Toe flexion                               5          5-     Reflexes:     0 to trace throughout upper and lower extremities     Babinski: Toes are down going     Sensory:   In both upper and lower extremities, sensation was intact to light touch    Results:    The following results, labs, and/or imaging were personally reviewed and/or listed for reference: See above/below          Assessment and Plan:  Gabriel Skinner is a 74 y.o. male with type 2 diabetes could not wait for starting Trulicity, followed by progressive left leg weakness in the proximal greater than distal distribution. 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  Superimposed axonal sensorimotor polyneuropathy.  These findings support diagnosis of diabetic amyotrophy.  Will reach out to his PCP for assistance with glucose control as we attempt a steroid trial.      PLAN:  Problem List Items Addressed This Visit       Diabetic amyotrophy associated with type 2 diabetes mellitus  (Multi) - Primary    Relevant Orders    Serum Protein Electrophoresis + Immunofixation (Completed)    Methylmalonic Acid (Completed)    Vitamin B12 (Completed)    Anti-Neutrophilic Cytoplasmic Antibody (Completed)    FRANCISCO with Reflex to TRACY (Completed)    LYME (B. BURGDORFERI) AB MODIFIED 2-TITER TESTING, WITH REFLEX TO IGM AND IGG BY NIGEL (Completed)   Plan to start prednisone 40 mg daily, with PPI, calcium, vitamin D      Christophe Wills MD  Neurology and Neuromuscular Medicine   [facility]  [facility] [place]         The total time today was 40 minutes. This time included pre-charting, obtaining history, physical examination, counseling and educating the patient/family, independently interpreting results, and documentation.

## 2025-03-02 LAB
ANA PAT SER IF-IMP: ABNORMAL
ANA SER QL IF: POSITIVE
ANA TITR SER IF: ABNORMAL TITER
ANCA AB SER QL: NEGATIVE
B BURGDOR IGG+IGM SER QL IA: <=0.9 INDEX
DSDNA AB SER-ACNC: ABNORMAL [IU]/ML
ENA RNP AB SER-ACNC: ABNORMAL
ENA SM AB SER IA-ACNC: ABNORMAL
ENA SM+RNP AB SER IA-ACNC: ABNORMAL
METHYLMALONATE SERPL-SCNC: NORMAL
NUCLEOSOME AB SER IA-ACNC: ABNORMAL
VIT B12 SERPL-MCNC: 525 PG/ML (ref 200–1100)

## 2025-03-04 LAB
ALBUMIN: 3.9 G/DL (ref 3.4–5)
ALPHA 1 GLOBULIN: 0.3 G/DL (ref 0.2–0.6)
ALPHA 2 GLOBULIN: 0.6 G/DL (ref 0.4–1.1)
ANA PAT SER IF-IMP: ABNORMAL
ANA SER QL IF: POSITIVE
ANA TITR SER IF: ABNORMAL TITER
ANCA AB SER QL: NEGATIVE
B BURGDOR IGG+IGM SER QL IA: <=0.9 INDEX
BETA GLOBULIN: 1 G/DL (ref 0.5–1.2)
CENTROMERE B AB SER-ACNC: ABNORMAL AI
DSDNA AB SER-ACNC: <1 IU/ML
ENA JO1 AB SER IA-ACNC: ABNORMAL AI
ENA RNP AB SER-ACNC: ABNORMAL AI
ENA SCL70 AB SER IA-ACNC: ABNORMAL AI
ENA SM AB SER IA-ACNC: ABNORMAL AI
ENA SM+RNP AB SER IA-ACNC: ABNORMAL AI
ENA SS-A AB SER IA-ACNC: ABNORMAL AI
ENA SS-B AB SER IA-ACNC: ABNORMAL AI
GAMMA GLOBULIN: 1.2 G/DL (ref 0.5–1.4)
IMMUNOFIXATION COMMENT: NORMAL
LABORATORY COMMENT REPORT: ABNORMAL
METHYLMALONATE SERPL-SCNC: 136 NMOL/L (ref 69–390)
NUCLEOSOME AB SER IA-ACNC: ABNORMAL AI
PATH REVIEW - SERUM IMMUNOFIXATION: NORMAL
PATH REVIEW-SERUM PROTEIN ELECTROPHORESIS: NORMAL
PROTEIN ELECTROPHORESIS COMMENT: NORMAL
RIBOSOMAL P AB SER-ACNC: ABNORMAL AI
VIT B12 SERPL-MCNC: 525 PG/ML (ref 200–1100)

## 2025-03-05 PROBLEM — E11.44 DIABETIC AMYOTROPHY ASSOCIATED WITH TYPE 2 DIABETES MELLITUS (MULTI): Status: ACTIVE | Noted: 2025-03-05

## 2025-03-13 DIAGNOSIS — E11.44 DIABETIC AMYOTROPHY ASSOCIATED WITH TYPE 2 DIABETES MELLITUS (MULTI): Primary | ICD-10-CM

## 2025-03-13 RX ORDER — PREDNISONE 20 MG/1
40 TABLET ORAL DAILY
Qty: 60 TABLET | Refills: 0 | Status: SHIPPED | OUTPATIENT
Start: 2025-03-13 | End: 2025-04-12

## 2025-03-13 RX ORDER — CHOLECALCIFEROL (VITAMIN D3) 50 MCG
50 TABLET ORAL DAILY
Qty: 30 TABLET | Refills: 0 | Status: SHIPPED | OUTPATIENT
Start: 2025-03-13 | End: 2025-04-12

## 2025-03-13 RX ORDER — PANTOPRAZOLE SODIUM 40 MG/1
40 TABLET, DELAYED RELEASE ORAL
Qty: 30 TABLET | Refills: 0 | Status: SHIPPED | OUTPATIENT
Start: 2025-03-13 | End: 2025-04-12

## 2025-03-13 RX ORDER — CALCIUM CARBONATE 500(1250)
1 TABLET,CHEWABLE ORAL DAILY
Qty: 30 TABLET | Refills: 0 | Status: SHIPPED | OUTPATIENT
Start: 2025-03-13 | End: 2025-04-12

## 2025-03-13 NOTE — PROGRESS NOTES
I spoke to his PCP, Alex White, about starting prednisone for diabetic amyotrophy.  We are in agreement that we will give this a 1 month trial see if it improves the symptoms.  His PCP will assist with glucose management with insulin during this time.

## 2025-03-25 ENCOUNTER — APPOINTMENT (OUTPATIENT)
Facility: CLINIC | Age: 75
End: 2025-03-25
Payer: MEDICARE

## 2025-04-17 ENCOUNTER — OFFICE VISIT (OUTPATIENT)
Facility: CLINIC | Age: 75
End: 2025-04-17
Payer: MEDICARE

## 2025-04-17 VITALS
BODY MASS INDEX: 25.9 KG/M2 | SYSTOLIC BLOOD PRESSURE: 135 MMHG | DIASTOLIC BLOOD PRESSURE: 82 MMHG | OXYGEN SATURATION: 99 % | HEART RATE: 101 BPM | WEIGHT: 191 LBS

## 2025-04-17 DIAGNOSIS — Q23.81 BICUSPID AORTIC VALVE: Primary | ICD-10-CM

## 2025-04-17 DIAGNOSIS — I35.0 NONRHEUMATIC AORTIC VALVE STENOSIS: ICD-10-CM

## 2025-04-17 DIAGNOSIS — I10 ESSENTIAL HYPERTENSION: ICD-10-CM

## 2025-04-17 PROCEDURE — 3079F DIAST BP 80-89 MM HG: CPT | Performed by: INTERNAL MEDICINE

## 2025-04-17 PROCEDURE — 3075F SYST BP GE 130 - 139MM HG: CPT | Performed by: INTERNAL MEDICINE

## 2025-04-17 PROCEDURE — 1159F MED LIST DOCD IN RCRD: CPT | Performed by: INTERNAL MEDICINE

## 2025-04-17 PROCEDURE — 1036F TOBACCO NON-USER: CPT | Performed by: INTERNAL MEDICINE

## 2025-04-17 PROCEDURE — 1126F AMNT PAIN NOTED NONE PRSNT: CPT | Performed by: INTERNAL MEDICINE

## 2025-04-17 PROCEDURE — 99214 OFFICE O/P EST MOD 30 MIN: CPT | Performed by: INTERNAL MEDICINE

## 2025-04-17 ASSESSMENT — PATIENT HEALTH QUESTIONNAIRE - PHQ9
SUM OF ALL RESPONSES TO PHQ9 QUESTIONS 1 AND 2: 0
1. LITTLE INTEREST OR PLEASURE IN DOING THINGS: NOT AT ALL
2. FEELING DOWN, DEPRESSED OR HOPELESS: NOT AT ALL

## 2025-04-17 ASSESSMENT — ENCOUNTER SYMPTOMS
ORTHOPNEA: 0
SHORTNESS OF BREATH: 0
WEIGHT LOSS: 0
OCCASIONAL FEELINGS OF UNSTEADINESS: 1
DEPRESSION: 0
DYSPNEA ON EXERTION: 0
WEAKNESS: 0
WEIGHT GAIN: 0
COUGH: 0
PND: 0
DIZZINESS: 0
WHEEZING: 0
LOSS OF SENSATION IN FEET: 0
NEAR-SYNCOPE: 0
MYALGIAS: 0
IRREGULAR HEARTBEAT: 0
CLAUDICATION: 0
DIAPHORESIS: 0
SYNCOPE: 0
PALPITATIONS: 0
FEVER: 0

## 2025-04-17 ASSESSMENT — LIFESTYLE VARIABLES: TOTAL SCORE: 0

## 2025-04-17 ASSESSMENT — PAIN SCALES - GENERAL: PAINLEVEL_OUTOF10: 0-NO PAIN

## 2025-04-17 NOTE — PROGRESS NOTES
Subjective      Chief Complaint   Patient presents with    6 month f/u        74-year-old male with history of cerebrovascular disease and a stroke in September of last year.  He had an echocardiogram that raised concern for a bicuspid aortic valve however it appears that his echo shows fusion of 2 cusps rather than a true bicuspid valve.  We elected to observe and repeat an echocardiogram in 1 year. He has been active, ambulates with a cane.           Review of Systems   Constitutional: Negative for diaphoresis, fever, weight gain and weight loss.   Eyes:  Negative for visual disturbance.   Cardiovascular:  Negative for chest pain, claudication, dyspnea on exertion, irregular heartbeat, leg swelling, near-syncope, orthopnea, palpitations, paroxysmal nocturnal dyspnea and syncope.   Respiratory:  Negative for cough, shortness of breath and wheezing.    Musculoskeletal:  Negative for muscle weakness and myalgias.   Neurological:  Negative for dizziness and weakness.   All other systems reviewed and are negative.       Medical History[1]     Surgical History[2]     Social History     Socioeconomic History    Marital status:      Spouse name: Not on file    Number of children: Not on file    Years of education: Not on file    Highest education level: Not on file   Occupational History    Not on file   Tobacco Use    Smoking status: Former     Current packs/day: 0.00     Types: Cigarettes     Quit date:      Years since quittin.3     Passive exposure: Past    Smokeless tobacco: Never   Vaping Use    Vaping status: Never Used   Substance and Sexual Activity    Alcohol use: Never    Drug use: Never    Sexual activity: Not Currently   Other Topics Concern    Not on file   Social History Narrative    Not on file     Social Drivers of Health     Financial Resource Strain: Low Risk  (2024)    Overall Financial Resource Strain (CARDIA)     Difficulty of Paying Living Expenses: Not hard at all   Food  Insecurity: No Food Insecurity (9/4/2024)    Hunger Vital Sign     Worried About Running Out of Food in the Last Year: Never true     Ran Out of Food in the Last Year: Never true   Transportation Needs: No Transportation Needs (9/4/2024)    PRAPARE - Transportation     Lack of Transportation (Medical): No     Lack of Transportation (Non-Medical): No   Physical Activity: Inactive (9/4/2024)    Exercise Vital Sign     Days of Exercise per Week: 0 days     Minutes of Exercise per Session: 0 min   Stress: No Stress Concern Present (9/4/2024)    [place] [employer] - Occupational Stress Questionnaire     Feeling of Stress : Not at all   Social Connections: Socially Isolated (9/4/2024)    Social Connection and Isolation Panel [NHANES]     Frequency of Communication with Friends and Family: Never     Frequency of Social Gatherings with Friends and Family: Never     Attends Presybeterian Services: Never     Active Member of Clubs or Organizations: No     Attends Club or Organization Meetings: Never     Marital Status:    Intimate Partner Violence: Not At Risk (9/4/2024)    Humiliation, Afraid, Rape, and Kick questionnaire     Fear of Current or Ex-Partner: No     Emotionally Abused: No     Physically Abused: No     Sexually Abused: No   Housing Stability: Low Risk  (9/4/2024)    Housing Stability Vital Sign     Unable to Pay for Housing in the Last Year: No     Number of Times Moved in the Last Year: 0     Homeless in the Last Year: No        Family History[3]     OBJECTIVE:    Vitals:    04/17/25 0904   BP: 135/82   Pulse: 101   SpO2: 99%        Vitals reviewed.   Constitutional:       Appearance: Normal and healthy appearance. Not in distress.   Pulmonary:      Effort: Pulmonary effort is normal.      Breath sounds: Normal breath sounds.   Cardiovascular:      Normal rate. Regular rhythm. Normal S1. Normal S2.       Murmurs: There is no murmur.      No gallop.  No click.   Pulses:     Intact distal  "pulses.   Edema:     Peripheral edema absent.   Skin:     General: Skin is warm and dry.   Neurological:      General: No focal deficit present.          Lab Review:   No results found for: \"CHOL\", \"TRIG\", \"HDL\", \"LDLDIRECT\"    No results found for: \"LDLCALC\"     Bicuspid aortic valve ([facility])  Probably not congenital at his age, and not stenotic. Will repeat an echo in 6m. CT chest to screen for any aortopathy.     Essential hypertension  Controlled. Lifestyle modifications were discussed. I advocated for mediterranean and plant based eating. We also discussed exercise. 150 minutes a week of moderate intensity exercise is recommended per our ACC/AHA guidelines            [1]   Past Medical History:  Diagnosis Date    Diabetes mellitus (Multi)     Hypertension     Stroke (Multi)    [2] History reviewed. No pertinent surgical history.  [3] No family history on file.    "

## 2025-04-17 NOTE — ASSESSMENT & PLAN NOTE
Controlled. Lifestyle modifications were discussed. I advocated for mediterranean and plant based eating. We also discussed exercise. 150 minutes a week of moderate intensity exercise is recommended per our ACC/AHA guidelines

## 2025-04-17 NOTE — ASSESSMENT & PLAN NOTE
Probably not congenital at his age, and not stenotic. Will repeat an echo in 6m. CT chest to screen for any aortopathy.

## 2025-06-09 ENCOUNTER — OFFICE VISIT (OUTPATIENT)
Dept: NEUROLOGY | Facility: CLINIC | Age: 75
End: 2025-06-09
Payer: MEDICARE

## 2025-06-09 VITALS
SYSTOLIC BLOOD PRESSURE: 148 MMHG | TEMPERATURE: 96 F | DIASTOLIC BLOOD PRESSURE: 76 MMHG | HEIGHT: 72 IN | RESPIRATION RATE: 18 BRPM | WEIGHT: 191 LBS | HEART RATE: 67 BPM | BODY MASS INDEX: 25.87 KG/M2

## 2025-06-09 DIAGNOSIS — E11.44 DIABETIC AMYOTROPHY ASSOCIATED WITH TYPE 2 DIABETES MELLITUS: Primary | ICD-10-CM

## 2025-06-09 PROCEDURE — 99215 OFFICE O/P EST HI 40 MIN: CPT | Performed by: STUDENT IN AN ORGANIZED HEALTH CARE EDUCATION/TRAINING PROGRAM

## 2025-06-09 PROCEDURE — 1159F MED LIST DOCD IN RCRD: CPT | Performed by: STUDENT IN AN ORGANIZED HEALTH CARE EDUCATION/TRAINING PROGRAM

## 2025-06-09 PROCEDURE — 3077F SYST BP >= 140 MM HG: CPT | Performed by: STUDENT IN AN ORGANIZED HEALTH CARE EDUCATION/TRAINING PROGRAM

## 2025-06-09 PROCEDURE — 3008F BODY MASS INDEX DOCD: CPT | Performed by: STUDENT IN AN ORGANIZED HEALTH CARE EDUCATION/TRAINING PROGRAM

## 2025-06-09 PROCEDURE — 3078F DIAST BP <80 MM HG: CPT | Performed by: STUDENT IN AN ORGANIZED HEALTH CARE EDUCATION/TRAINING PROGRAM

## 2025-06-09 PROCEDURE — 1126F AMNT PAIN NOTED NONE PRSNT: CPT | Performed by: STUDENT IN AN ORGANIZED HEALTH CARE EDUCATION/TRAINING PROGRAM

## 2025-06-09 PROCEDURE — G2211 COMPLEX E/M VISIT ADD ON: HCPCS | Performed by: STUDENT IN AN ORGANIZED HEALTH CARE EDUCATION/TRAINING PROGRAM

## 2025-06-09 ASSESSMENT — PATIENT HEALTH QUESTIONNAIRE - PHQ9
2. FEELING DOWN, DEPRESSED OR HOPELESS: NOT AT ALL
1. LITTLE INTEREST OR PLEASURE IN DOING THINGS: NOT AT ALL
SUM OF ALL RESPONSES TO PHQ9 QUESTIONS 1 AND 2: 0

## 2025-06-09 ASSESSMENT — ENCOUNTER SYMPTOMS
DEPRESSION: 0
LOSS OF SENSATION IN FEET: 0
OCCASIONAL FEELINGS OF UNSTEADINESS: 0

## 2025-06-09 ASSESSMENT — PAIN SCALES - GENERAL: PAINLEVEL_OUTOF10: 0-NO PAIN

## 2025-06-09 NOTE — PATIENT INSTRUCTIONS
We did nerve testing today which show that you have 2 different problems.  First you have a diabetic neuropathy which has likely been there for a very long time related to having diabetes.   The second issue which is more serious, is called diabetic amyotrophy which is causing injury to the nerves in the left leg causing weakness.      You are now improving in your leg strength. We can hold off on steroids for now.     Please do the following:     Work with the neuro-PT on strength and recovery.     Please reach out of you are getting weaker or new parts of the body are affected.     Make a follow-up appointment with me in 3 months.      You can reach us at our office phone: [phone].     Christophe Wills MD  Neurology and Neuromuscular Medicine   [facility] [place]   [facility]  Primary office: [facility], [address]  [address] [place] [address]

## 2025-06-09 NOTE — PROGRESS NOTES
"Neurology/Neuromuscular Follow Up     Gabriel Skinner, MRN: [id number], : 1950  Primary Care Physician: Alex White, ALVARADO-CNP     History Of Present Illness:  Mr. Skinner is a 75 y.o. male who presents for follow up of left lower extremity diabetic amyotrophy.     Since last visit,   His strength and gait is slightly improved  He is able to walk further distance, was able to walk across the street, still using a cane.  He has had no further orthostatic lightheadedness or loss of consciousness      To recap, I initially saw him inpatient 2024 for after a fall.  He had recently started Trulicity and lost 30 pounds at that time.  After which she developed weakness in the left leg.  He cannot remember if he had pain or not.      See details of my assessment from inpatient evaluation: \"presents after a fall, associated with weeks of left leg weakness and confusional episode.  CT head which shows no acute infarct.  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  I personally reviewed his MRI brain which showed his chronic infarcts, however there is no new infarct explain his persistent left weakness. His examination reveals weakness and an unusual pattern including proximal left leg muscles in hip flexion extension, knee extension, and leg abduction adduction.  There is no definite sensory complaint.  Reflexes are diffusely diminished however he has diabetes unlikely neuropathy.  This weakness could represent a L2-4 polyradiculopathy or lumbosacral pathology.       I talked with his wife at length, and she confirms that his strength returned to normal after his previous stroke, and that the left leg only became weak within the last 1 to 2 weeks.  This was occurring around the same time that he was put on Trulicity and had low blood sugars into the 60-70s, and then complained of severe pain in " "the left hip prior to the onset of weakness.    We performed an MRI of the lumbar spine which I personally reviewed, which does not show any significant stenosis or herniations to explain the left leg weakness. 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     In regards to the starring confusional spell at the onset of this, the possibilities include a focal seizure, orthostatic hypotensive event, keeping in mind his diabetes, or possibly a TIA however this is less likely.  I would like to proceed with the EEG to look for any epileptogenicity.\"      Medications:  Current Outpatient Medications   Medication Instructions    acetaminophen (TYLENOL) 650 mg, oral, Every 4 hours PRN    amLODIPine (NORVASC) 2.5 mg, oral, Daily    atorvastatin (LIPITOR) 40 mg, Daily    calcium carbonate (TUMS) 500 mg, oral, 4 times daily PRN    clopidogrel (PLAVIX) 75 mg, Daily    finasteride (PROSCAR) 5 mg, Daily    glucagon (GLUCAGEN) 1 mg, intramuscular, Every 15 min PRN    insulin lispro 0-5 Units, subcutaneous, 3 times daily (morning, midday, late afternoon), Take as directed per insulin instructions.    Lantus U-100 Insulin 15 Units, subcutaneous, Nightly, Take as directed per insulin instructions.    metFORMIN (GLUCOPHAGE) 500 mg, Daily with breakfast    metoprolol tartrate (LOPRESSOR) 25 mg, 2 times daily    pantoprazole (PROTONIX) 40 mg, oral, Daily before breakfast, Do not crush, chew, or split.       Physical Exam:  /76 (BP Location: Left arm, Patient Position: Sitting, BP Cuff Size: Adult)   Pulse 67   Temp 35.6 °C (96 °F) (Temporal)   Resp 18   Ht 1.829 m (6')   Wt 86.6 kg (191 lb)   BMI 25.90 kg/m²    General: NAD, cooperative   Neuro:  Awake alert, language normal, no dysarthria    Motor:   Muscle bulk: Normal throughout.  Muscle tone: Normal in both upper and lower extremities.  Movements: No fasciculations, tremors, or other abnormal movement.   Manual Muscle " Testing (MMT) reveals the following MRC grades:  R          L   Shoulder abduction                 5          5  Elbow flexion                           5          5  Elbow extension                      5          5  Finger flexion                           5          5  Finger extension                      5          5  Finger abduction                     5          5  Thumb abduction                    5          5    Hip flexion                               5          3-  Hip extension   5  5  Hip abduction                          5          3  Hip adduction                          5          4-  Knee flexion                             5          5  Knee extension                        5          5  Ankle dorsiflexion                    5          5-  Ankle plantarflexion                5          5  Ankle Inversion                        5          5-  Ankle Eversion                         5          5-  Big toe extension                    5          5-  Toe flexion                               5          5-     Reflexes:     0 to trace throughout upper and lower extremities     Babinski: Toes are down going     Sensory:   In both upper and lower extremities, sensation was intact to light touch    Results:    The following results, labs, and/or imaging were personally reviewed and/or listed for reference: See above/below      EMG 2/27/2025 which showed 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  Superimposed axonal sensorimotor polyneuropathy.     Blood work included positive FRANCISCO, negative TRACY, normal SPEP, negative Lyme, normal B12, normal MMA.        Assessment and Plan:  Gabriel Skinner is a 75 y.o. male with type 2 diabetes with weight loss after starting Trulicity, followed by progressive left leg weakness in the proximal greater than distal distribution Sep 2024. I performed an EMG 2/27/2025 which showed chronic left polyradiculopathy  the wound or for plexopathy 3, L4, L5 myotomes with active denervation.  Superimposed axonal sensorimotor polyneuropathy.  These findings support diagnosis of diabetic amyotrophy.  He had significant weakness in the left L3-L4 myotome, so we originally planned to treat with prednisone.  The patient ultimately decided not to take it, luckily he has recovered some strength in the left leg with minor improvements in gait.  Strength exam specifically in the knee extension is dramatically improved, so we will forego additional treatment at this time and encouraged him to work with physical therapy.  Should his weakness relapse or pain return then a course of prednisone would be warranted.    As a secondary issue he likely had episodes of orthostatic hypotension and lightheadedness which seem to be resolving, without further presyncopal symptoms.  Will continue to monitor this.    PLAN:  Problem List Items Addressed This Visit       Diabetic amyotrophy associated with type 2 diabetes mellitus - Primary    Relevant Orders    Referral to Physical Therapy  Should his weakness relapse or pain return then a course of prednisone would be warranted.    Follow Up In Neurology        Christophe Wills MD  Neurology and Neuromuscular Medicine     Medical decision making was high complexity.  The patient has a neurological disorder with threat to bodily function.  I discussed his case with his PCP.

## 2026-01-13 ENCOUNTER — APPOINTMENT (OUTPATIENT)
Dept: NEUROLOGY | Facility: CLINIC | Age: 76
End: 2026-01-13
Payer: MEDICARE